# Patient Record
Sex: FEMALE | Race: WHITE | NOT HISPANIC OR LATINO | Employment: OTHER | URBAN - METROPOLITAN AREA
[De-identification: names, ages, dates, MRNs, and addresses within clinical notes are randomized per-mention and may not be internally consistent; named-entity substitution may affect disease eponyms.]

---

## 2020-05-08 ENCOUNTER — TELEMEDICINE (OUTPATIENT)
Dept: FAMILY MEDICINE CLINIC | Facility: CLINIC | Age: 70
End: 2020-05-08

## 2020-05-08 VITALS — HEIGHT: 62 IN | WEIGHT: 115 LBS | BODY MASS INDEX: 21.16 KG/M2

## 2020-05-08 DIAGNOSIS — N30.01 ACUTE CYSTITIS WITH HEMATURIA: ICD-10-CM

## 2020-05-08 DIAGNOSIS — K29.00 ACUTE SUPERFICIAL GASTRITIS WITHOUT HEMORRHAGE: Primary | ICD-10-CM

## 2020-05-08 DIAGNOSIS — N18.30 STAGE 3 CHRONIC KIDNEY DISEASE (HCC): ICD-10-CM

## 2020-05-08 DIAGNOSIS — N18.30 ANEMIA DUE TO STAGE 3 CHRONIC KIDNEY DISEASE (HCC): ICD-10-CM

## 2020-05-08 DIAGNOSIS — D63.1 ANEMIA DUE TO STAGE 3 CHRONIC KIDNEY DISEASE (HCC): ICD-10-CM

## 2020-05-08 PROBLEM — K21.00 GERD WITH ESOPHAGITIS: Status: ACTIVE | Noted: 2019-11-14

## 2020-05-08 PROBLEM — C50.411 MALIGNANT NEOPLASM OF UPPER-OUTER QUADRANT OF RIGHT BREAST IN FEMALE, ESTROGEN RECEPTOR POSITIVE (HCC): Status: ACTIVE | Noted: 2019-09-13

## 2020-05-08 PROBLEM — Z85.3 HISTORY OF RIGHT BREAST CANCER: Status: ACTIVE | Noted: 2020-05-08

## 2020-05-08 PROBLEM — T66.XXXA RADIATION THERAPY COMPLICATION: Status: ACTIVE | Noted: 2020-03-03

## 2020-05-08 PROBLEM — Z79.811 VISIT FOR MONITORING ARIMIDEX THERAPY: Status: ACTIVE | Noted: 2020-03-03

## 2020-05-08 PROBLEM — Z17.0 MALIGNANT NEOPLASM OF UPPER-OUTER QUADRANT OF RIGHT BREAST IN FEMALE, ESTROGEN RECEPTOR POSITIVE: Status: ACTIVE | Noted: 2019-09-13

## 2020-05-08 PROBLEM — K21.00 GERD WITH ESOPHAGITIS: Status: RESOLVED | Noted: 2019-11-14 | Resolved: 2020-05-08

## 2020-05-08 PROBLEM — Z51.81 VISIT FOR MONITORING ARIMIDEX THERAPY: Status: ACTIVE | Noted: 2020-03-03

## 2020-05-08 PROCEDURE — 99214 OFFICE O/P EST MOD 30 MIN: CPT | Performed by: FAMILY MEDICINE

## 2020-05-08 RX ORDER — UBIDECARENONE 75 MG
50 CAPSULE ORAL DAILY
COMMUNITY

## 2020-05-08 RX ORDER — SULFAMETHOXAZOLE AND TRIMETHOPRIM 800; 160 MG/1; MG/1
1 TABLET ORAL 2 TIMES DAILY
Qty: 20 TABLET | Refills: 0 | Status: SHIPPED | OUTPATIENT
Start: 2020-05-08 | End: 2020-09-28

## 2020-05-08 RX ORDER — PANTOPRAZOLE SODIUM 40 MG/1
40 TABLET, DELAYED RELEASE ORAL DAILY
Qty: 30 TABLET | Refills: 3 | Status: SHIPPED | OUTPATIENT
Start: 2020-05-08 | End: 2020-07-13 | Stop reason: SDUPTHER

## 2020-05-08 RX ORDER — ANASTROZOLE 1 MG/1
1 TABLET ORAL DAILY
COMMUNITY
Start: 2019-11-14

## 2020-05-08 RX ORDER — PNV NO.95/FERROUS FUM/FOLIC AC 28MG-0.8MG
TABLET ORAL DAILY
COMMUNITY

## 2020-05-08 RX ORDER — PANTOPRAZOLE SODIUM 40 MG/1
40 TABLET, DELAYED RELEASE ORAL DAILY
COMMUNITY
End: 2020-05-08 | Stop reason: SDUPTHER

## 2020-05-08 NOTE — PROGRESS NOTES
Subjective   Enma Mckinney is a 70 y.o. female.     There were no vitals filed for this visit.     Chief Complaint   Patient presents with   • Establish Care     patient needs to transfer to [facility]   • Flank Pain     patient is needing med for kidney infection         History of Present Illness    This appointment was converted to a video visit in accordance with CDC recommendations and guidelines given the current coronavirus pandemic    Patient presents for recurrent gastritis and likely UTI, recurrent?    Patient's last office visit with me at [facility] was October 2019 for follow-up labs, follow-up on gastritis, and recent diagnosis of breast cancer.    For the most part, patient has been doing very well even given the current pandemic.  She continues to work at [employer].  She has been given the clear from her surgeon/breast oncologist.    She does complain of about a 2-week history of mid abdominal pain with episodes of nausea reoccurring.  She was treated for gastritis in the fall 2019 with Protonix, iron, and discontinuation of all anti-inflammatories.  At that time, her hemoglobin was 9.6.  Yet with treatment this did improve to 12.  She was feeling much better clinically after  2 to 3 months of treatment thus discontinued the Protonix.  She has remained off all anti-inflammatories.  However, recently she is starting to get the mid abdominal pain again.  This is associated with nausea.  No vomiting, no fever.  This seemed to last for few minutes.  Her colonoscopy is up-to-date, 2018.  No EGD has been done.  Her weight has remained stable    She also believes she is getting another urinary tract infection again.  She had a UTI in December that was treated yet this did not clear up and she ended up at the [facility] in January.  At that time she was treated with a sulfa medication which did clear the urinary tract infection.  She complains of about a 2-week history of on and off again lower  abdominal pressure.  She also has seen blood in her urine.  She has been drinking lots of water and using Azo stat and the symptoms have improved somewhat.  Yet still describes some lower abdominal/pelvic pressure with urination and low back pain.  Denies fever    The following portions of the patient's history were reviewed and updated as appropriate: allergies, current medications, past family history, past medical history, past social history, past surgical history and problem list.    Review of Systems   Constitutional: Negative for fever, unexpected weight gain and unexpected weight loss.   Gastrointestinal: Positive for abdominal pain and nausea. Negative for blood in stool, constipation, diarrhea, vomiting and GERD.   Genitourinary: Positive for dysuria, frequency, hematuria and urgency.       Objective   Physical Exam   Constitutional: She is oriented to person, place, and time. She appears well-developed. No distress.   HENT:   Head: Normocephalic and atraumatic.   Pulmonary/Chest: Effort normal.   Abdominal:   Slight tenderness to palpation by patient in the mid epigastric region as well as suprapubic region   Neurological: She is alert and oriented to person, place, and time.   Psychiatric: She has a normal mood and affect. Her behavior is normal. Judgment and thought content normal.   Nursing note and vitals reviewed.       LABS/STUDIES --12/2019 GFR 52, October 2019 hemoglobin 12    Procedures     Assessment/Plan   Enma was seen today for establish care and flank pain.    Diagnoses and all orders for this visit:    Acute gastritis --uncontrolled/recurrent?  At this time will restart Protonix 40 mg 1 p.o. daily.  Must reevaluate patient in the office in within the next 2 to 4 weeks and obtain a repeat CBC.  May very likely need endoscopy?  H. Pylori.  Patient is to remain off all anti-inflammatories    Acute cystitis with hematuria --new diagnosis/recurrent UTIs?  Will treat with Bactrim 1 p.o. twice  daily x10 days.  Will need to recheck urinalysis within the next 4 weeks.  Will start to monitor and culture all future potential urinary tract infections for reoccurrence.    Stage 3 chronic kidney disease (CMS/HCC) --stable.  Remain off all anti-inflammatories    Anemia due to stage 3 chronic kidney disease (CMS/HCC) --stable.  Secondary to chronic kidney disease and/or gastritis?  May restart iron 3 and 25 mg 1 p.o. daily.  Will recheck CBC in near future    Other orders  -     pantoprazole (PROTONIX) 40 MG EC tablet; Take 1 tablet by mouth Daily.  -     sulfamethoxazole-trimethoprim (Bactrim DS) 800-160 MG per tablet; Take 1 tablet by mouth 2 (Two) Times a Day.      [facility] video visit time 25 minutes           Return in about 4 weeks (around 6/5/2020), or Please change appointment from May 20 to a Tuesday or Friday with me within the next 2 to 4 weeks on, for Recheck.     This was an audio and video enabled telemedicine encounter.

## 2020-05-08 NOTE — PATIENT INSTRUCTIONS
Restart Protonix 1 a day  Continue to avoid all anti-inflammatories  Take the Bactrim 1 twice a day for 10 days

## 2020-05-29 ENCOUNTER — OFFICE VISIT (OUTPATIENT)
Dept: FAMILY MEDICINE CLINIC | Facility: CLINIC | Age: 70
End: 2020-05-29

## 2020-05-29 VITALS
BODY MASS INDEX: 21.16 KG/M2 | OXYGEN SATURATION: 97 % | WEIGHT: 115 LBS | SYSTOLIC BLOOD PRESSURE: 118 MMHG | TEMPERATURE: 99.1 F | DIASTOLIC BLOOD PRESSURE: 64 MMHG | HEART RATE: 73 BPM | HEIGHT: 62 IN

## 2020-05-29 DIAGNOSIS — N30.01 ACUTE CYSTITIS WITH HEMATURIA: ICD-10-CM

## 2020-05-29 DIAGNOSIS — N18.30 ANEMIA DUE TO STAGE 3 CHRONIC KIDNEY DISEASE (HCC): ICD-10-CM

## 2020-05-29 DIAGNOSIS — N02.9 RECURRENT AND PERSISTENT HEMATURIA: ICD-10-CM

## 2020-05-29 DIAGNOSIS — R10.13 EPIGASTRIC PAIN: Primary | ICD-10-CM

## 2020-05-29 DIAGNOSIS — K29.00 ACUTE SUPERFICIAL GASTRITIS WITHOUT HEMORRHAGE: ICD-10-CM

## 2020-05-29 DIAGNOSIS — N39.0 RECURRENT UTI: ICD-10-CM

## 2020-05-29 DIAGNOSIS — R30.0 DYSURIA: ICD-10-CM

## 2020-05-29 DIAGNOSIS — D63.1 ANEMIA DUE TO STAGE 3 CHRONIC KIDNEY DISEASE (HCC): ICD-10-CM

## 2020-05-29 DIAGNOSIS — N39.0 RECURRENT UTI: Primary | ICD-10-CM

## 2020-05-29 LAB
BILIRUB BLD-MCNC: NEGATIVE MG/DL
CLARITY, POC: CLEAR
COLOR UR: YELLOW
GLUCOSE UR STRIP-MCNC: NEGATIVE MG/DL
KETONES UR QL: NEGATIVE
LEUKOCYTE EST, POC: ABNORMAL
NITRITE UR-MCNC: NEGATIVE MG/ML
PH UR: 5.5 [PH] (ref 5–8)
PROT UR STRIP-MCNC: ABNORMAL MG/DL
RBC # UR STRIP: ABNORMAL /UL
SP GR UR: 1.03 (ref 1–1.03)
UROBILINOGEN UR QL: NORMAL

## 2020-05-29 PROCEDURE — 81003 URINALYSIS AUTO W/O SCOPE: CPT | Performed by: FAMILY MEDICINE

## 2020-05-29 PROCEDURE — 99214 OFFICE O/P EST MOD 30 MIN: CPT | Performed by: FAMILY MEDICINE

## 2020-05-29 NOTE — PROGRESS NOTES
"Subjective   Enma Mckinney is a 70 y.o. female.     Vitals:    05/29/20 1150   BP: 118/64   Pulse: 73   Temp: 99.1 °F (37.3 °C)   SpO2: 97%        Chief Complaint   Patient presents with   • Anemia     follow up no complains    • Chronic Kidney Disease     follow up no complains    • Abdominal Pain        History of Present Illness    4-week follow-up for abdominal pain/gastritis, anemia, recurrent UTI and hematuria    Patient was evaluated by means of a telehealth visit approximately 4 weeks ago for recurrent episode of gastritis and recurrent UTI with hematuria.    Patient has had this history of on and off again \"burning upper abdominal pain\" for several months.  She was treated in the past with as needed PPIs which helped tremendously thus no work-up was pursued.  Then in August 2019 I diagnosed her with anemia and some concern with weight loss.  However, at that time she was also diagnosed with a new breast cancer.  Thus, she underwent a lumpectomy with radiation treatment during the fall 2019.  Patient states she was doing much better overall until approximately last month when her abdominal pain/burning feeling after eating which started to recur.  She describes this as episodic burning discomfort after eating.  She states her weight has been stable over the last 6 months.  She also describes some type of dysphasia to certain foods.  No nausea or vomiting, or fever.  She has never had an upper endoscopy.  Thus, 4 weeks ago do telehealth she was prescribed Protonix.  She was told to avoid all anti-inflammatories.  This treatment has helped; however, she still does have this episodic intermittent burning in her upper abdomen after eating.    In addition, she is also had recurrent urinary tract infections with hematuria over the last several months.  She was evaluated on both occasions at an [facility] care center and diagnosed with UTIs and treated with antibiotics.  The first 1 was in December 2019 and the " second episode was March 2020.  Then again about 4 weeks ago she complained of similar feelings with dysuria, increased frequency, and blood in her urine.  Thus to a telehealth visit she was started on antibiotics and told to follow-up today.  She states the antibiotic I gave her/Bactrim did improve her symptoms.  She is without symptoms today.  Denies blood, dysuria, increased frequency today.    She is also had chronic renal insufficiency that has remained stable.  Note, she did have a CBC and a CMP performed in March 2020 at 1 of these immediate care visits for the recurrent urinary tract infection.  Her hemoglobin was normal, GFR 44    The following portions of the patient's history were reviewed and updated as appropriate: allergies, current medications, past family history, past medical history, past social history, past surgical history and problem list.    Review of Systems   Constitutional: Negative.  Negative for fatigue.   Eyes: Negative.    Respiratory: Negative.    Cardiovascular: Negative for chest pain, palpitations and leg swelling.   Gastrointestinal: Negative.    Endocrine: Negative.    Genitourinary: Negative.    Musculoskeletal: Positive for arthralgias.   Allergic/Immunologic: Negative.    Neurological: Negative.    Hematological: Negative.    I have reviewed and confirmed the accuracy of the ROS as documented by the MA/LPN/RN Evelia Baldwin MD      Objective   Physical Exam   Constitutional: She appears well-developed.   HENT:   Head: Normocephalic and atraumatic.   Eyes: Pupils are equal, round, and reactive to light. Conjunctivae are normal.   Neck: Normal range of motion. Neck supple. No thyromegaly present.   Cardiovascular: Normal rate, regular rhythm and normal heart sounds.   Pulmonary/Chest: Effort normal and breath sounds normal.   Abdominal: Soft. Bowel sounds are normal. She exhibits no distension. There is no hepatosplenomegaly. There is no tenderness.   Musculoskeletal: She  exhibits no edema.   Lymphadenopathy:     She has no cervical adenopathy.   Psychiatric: She has a normal mood and affect. Her behavior is normal. Judgment and thought content normal.   Nursing note and vitals reviewed.       LABS/STUDIES --March 2020 normal CBC, GFR 44, normal electrolytes, normal creatinine                                 Today's urine dip significant for positive blood, 300+ protein, trace leukocytes    Procedures     Assessment/Plan   Enma was seen today for anemia, chronic kidney disease and abdominal pain.    Diagnoses and all orders for this visit:    Epigastric pain --uncontrolled/recurrent.  Will refer to gastroenterology, patient needs EGD.  Need to avoid all orders.  Also, continue to take Protonix 40 mg 1 p.o. daily.  -     Ambulatory Referral to Gastroenterology    Acute superficial gastritis without hemorrhage  -     Ambulatory Referral to Gastroenterology    Anemia due to stage 3 chronic kidney disease (CMS/HCC) --stable.  Will recheck CBC and BMP in approximately 6 weeks.. May need referral to nephrology as well and work-up with renal ultrasound, etc.    Recurrent and persistent hematuria --uncontrolled.  Will send today's urine for culture and sensitivity.  If positive will treat accordingly.  Given the recurrent UTIs of such short duration, renal insufficiency, and persistent hematuria will refer to urology for further evaluation  -     Ambulatory Referral to Urology    Recurrent UTI --uncontrolled.  Will recheck urine culture and sensitivity today and treat accordingly  -     Ambulatory Referral to Urology    Acute cystitis with hematuria  -     Urine Culture - Urine, Urine, Clean Catch    Dysuria  -     POC Urinalysis Dipstick, Automated      Recheck CBC and BMP in 6 weeks           Return in about 6 weeks (around 7/10/2020) for Recheck.

## 2020-05-29 NOTE — PATIENT INSTRUCTIONS
Continue to avoid all anti-inflammatories, continue Protonix as prescribed  Referral to gastroenterology and urology  Follow-up with me in 6 weeks

## 2020-05-31 LAB
BACTERIA UR CULT: NORMAL
BACTERIA UR CULT: NORMAL

## 2020-06-30 ENCOUNTER — OFFICE VISIT (OUTPATIENT)
Dept: GASTROENTEROLOGY | Facility: CLINIC | Age: 70
End: 2020-06-30

## 2020-06-30 VITALS — TEMPERATURE: 97.6 F | WEIGHT: 115 LBS | HEIGHT: 63 IN | BODY MASS INDEX: 20.38 KG/M2

## 2020-06-30 DIAGNOSIS — R10.13 EPIGASTRIC PAIN: Primary | ICD-10-CM

## 2020-06-30 PROCEDURE — 99203 OFFICE O/P NEW LOW 30 MIN: CPT | Performed by: INTERNAL MEDICINE

## 2020-06-30 NOTE — PROGRESS NOTES
Chief Complaint   Patient presents with   • Abdominal Pain   • Nausea     Enma Mckinney is a 70 y.o. female who presents with a history of years of episodic abdominal pain  HPI     Patient 70-year-old female with history of breast cancer status post lumpectomy in January and recurrent UTIs with weight loss several years ago when her  passed away but otherwise stable presents now for evaluation of years of epigastric pain intermittently.  Pain occurs acutely and resolves within a minute or 2.  Pain unrelated to eating or drinking.  Patient reports pain unrelated to bowel movements or no bowel movements.  Patient denies any constipation or diarrhea no issues with her bowels at all.  Patient with no fever chills no recent weight loss and the symptoms have been going on for years.  Patient's weight loss occurred after patient's  passed but is been stable for the last year or so.  Patient denies any bright red blood per rectum or melena.    Past Medical History:   Diagnosis Date   • Anemia    • Breast cancer (CMS/HCC)    • Contact dermatitis    • Gastritis    • UTI (urinary tract infection)    • Weight loss        Current Outpatient Medications:   •  anastrozole (ARIMIDEX) 1 MG tablet, Take 1 mg by mouth Daily., Disp: , Rfl:   •  APPLE CIDER VINEGAR PO, Take  by mouth Daily., Disp: , Rfl:   •  Calcium Carbonate-Vitamin D 600-200 MG-UNIT tablet, Take  by mouth., Disp: , Rfl:   •  ferrous sulfate 325 (65 Fe) MG tablet, Take  by mouth Daily., Disp: , Rfl:   •  pantoprazole (PROTONIX) 40 MG EC tablet, Take 1 tablet by mouth Daily., Disp: 30 tablet, Rfl: 3  •  vitamin B-12 (CYANOCOBALAMIN) 100 MCG tablet, Take 50 mcg by mouth Daily., Disp: , Rfl:   •  sulfamethoxazole-trimethoprim (Bactrim DS) 800-160 MG per tablet, Take 1 tablet by mouth 2 (Two) Times a Day., Disp: 20 tablet, Rfl: 0  No Known Allergies  Social History     Socioeconomic History   • Marital status:      Spouse name: Not on file   •  Number of children: Not on file   • Years of education: Not on file   • Highest education level: Not on file   Tobacco Use   • Smoking status: Never Smoker   • Smokeless tobacco: Never Used   Substance and Sexual Activity   • Alcohol use: Yes     Comment: occ glass of wine    • Drug use: Never     History reviewed. No pertinent family history.  Review of Systems   Constitutional: Negative.    HENT: Negative.    Eyes: Negative.    Respiratory: Negative.    Cardiovascular: Negative.    Gastrointestinal: Positive for abdominal pain. Negative for abdominal distention, anal bleeding, blood in stool, constipation, diarrhea, nausea, rectal pain and vomiting.   Endocrine: Negative.    Musculoskeletal: Negative.    Skin: Negative.    Allergic/Immunologic: Negative.    Hematological: Negative.      Vitals:    06/30/20 0915   Temp: 97.6 °F (36.4 °C)     Physical Exam   Constitutional: She is oriented to person, place, and time. She appears well-developed and well-nourished.   HENT:   Head: Normocephalic and atraumatic.   Eyes: Pupils are equal, round, and reactive to light. No scleral icterus.   Neck: Normal range of motion.   Cardiovascular: Normal rate, regular rhythm and normal heart sounds. Exam reveals no gallop and no friction rub.   No murmur heard.  Pulmonary/Chest: Effort normal and breath sounds normal. She has no wheezes. She has no rales.   Abdominal: Soft. Bowel sounds are normal. She exhibits no shifting dullness, no distension, no pulsatile liver, no fluid wave, no abdominal bruit, no ascites, no pulsatile midline mass and no mass. There is no hepatosplenomegaly. There is no tenderness. There is no rigidity and no guarding. No hernia.   Musculoskeletal: Normal range of motion. She exhibits no edema.   Lymphadenopathy:     She has no cervical adenopathy.   Neurological: She is alert and oriented to person, place, and time. No cranial nerve deficit.   Skin: Skin is warm and dry. No rash noted.   Psychiatric: She  has a normal mood and affect. Her behavior is normal.   Nursing note and vitals reviewed.    Diagnoses and all orders for this visit:    Epigastric pain    Other orders  -     APPLE CIDER VINEGAR PO; Take  by mouth Daily.    Patient 70-year-old female with history of breast cancer status post lumpectomy who presents with years of intermittent abdominal pain in the epigastric area unrelated to eating or drinking unrelated to bowels or no bowels.  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  Patient reports pain comes as a spasm-like discomfort lasts 1 to 2 minutes then resolves completely.  Patient reports last episode happened while she was driving about 11 in the morning.  Patient reports no association with what she eats or when it happens.  Symptoms inconsistent with internal GI disorder.  Patient does describe a complicated gallbladder resection 30 years ago with severe inflammation requiring 10 days in the hospital.  Symptoms may be related to internal hernias and adhesions.  For now patient is already scheduled for CT of the abdomen July 7, will await findings on CT before further intervention recommended.  Will recommend patient continue PPI pending this evaluation and assess if there is any change in the degree of her symptoms.

## 2020-07-13 ENCOUNTER — TELEPHONE (OUTPATIENT)
Dept: GASTROENTEROLOGY | Facility: CLINIC | Age: 70
End: 2020-07-13

## 2020-07-13 ENCOUNTER — OFFICE VISIT (OUTPATIENT)
Dept: FAMILY MEDICINE CLINIC | Facility: CLINIC | Age: 70
End: 2020-07-13

## 2020-07-13 VITALS
HEIGHT: 63 IN | TEMPERATURE: 98.2 F | BODY MASS INDEX: 21.26 KG/M2 | DIASTOLIC BLOOD PRESSURE: 78 MMHG | SYSTOLIC BLOOD PRESSURE: 130 MMHG | WEIGHT: 120 LBS

## 2020-07-13 DIAGNOSIS — N02.9 RECURRENT AND PERSISTENT HEMATURIA: ICD-10-CM

## 2020-07-13 DIAGNOSIS — D63.1 ANEMIA DUE TO STAGE 3 CHRONIC KIDNEY DISEASE (HCC): ICD-10-CM

## 2020-07-13 DIAGNOSIS — N39.0 RECURRENT UTI: ICD-10-CM

## 2020-07-13 DIAGNOSIS — K29.00 ACUTE SUPERFICIAL GASTRITIS WITHOUT HEMORRHAGE: Primary | ICD-10-CM

## 2020-07-13 DIAGNOSIS — N18.30 ANEMIA DUE TO STAGE 3 CHRONIC KIDNEY DISEASE (HCC): ICD-10-CM

## 2020-07-13 PROBLEM — R30.0 DYSURIA: Status: RESOLVED | Noted: 2020-05-29 | Resolved: 2020-07-13

## 2020-07-13 PROBLEM — T66.XXXA RADIATION THERAPY COMPLICATION: Status: RESOLVED | Noted: 2020-03-03 | Resolved: 2020-07-13

## 2020-07-13 PROBLEM — N30.01 ACUTE CYSTITIS WITH HEMATURIA: Status: RESOLVED | Noted: 2020-05-08 | Resolved: 2020-07-13

## 2020-07-13 PROCEDURE — 99214 OFFICE O/P EST MOD 30 MIN: CPT | Performed by: FAMILY MEDICINE

## 2020-07-13 RX ORDER — PANTOPRAZOLE SODIUM 40 MG/1
40 TABLET, DELAYED RELEASE ORAL DAILY
Qty: 90 TABLET | Refills: 1 | Status: SHIPPED | OUTPATIENT
Start: 2020-07-13 | End: 2021-02-05

## 2020-07-13 NOTE — PATIENT INSTRUCTIONS
Continue current treatment plan.  Continue to avoid all anti-inflammatories  Follow-up with specialist as planned

## 2020-07-13 NOTE — PROGRESS NOTES
Subjective   Enma Mckinney is a 70 y.o. female.     Vitals:    07/13/20 1238   BP: 130/78   Temp: 98.2 °F (36.8 °C)        Chief Complaint   Patient presents with   • Abdominal Pain     6 weeks follow up pt dping better         History of Present Illness    6-week follow-up on abdominal pain and hematuria and renal dysfunction    Last office visit with me patient was still having persistent epigastric pain despite being off anti-inflammatories and treatment with a PPI for 4 to 6 weeks.  Thus, she was referred to GI/Dr. Dale for further evaluation of possible endoscopy.  Per GI note the decision was made to hold on any endoscopy at the time given she start to improve clinically as well as had planned outpatient CT scans of abdomen and pelvis due to the persistent hematuria.  She was also referred to urology/Dr. Castellanos for work-up of persistent and recurrent hematuria.  She did have outpatient CT abdomen/ pelvis last week at first urology office, results pending.  She does have a follow-up with the specialist/urologist later this week.    On a good note, her upper abdominal pain is much improved.  She is gaining weight.  Appetite good.  She does have follow-up with GI, however they are waiting results of CT scan before determining whether to do endoscopy.  She continues to remain off all anti-inflammatories for over 6 months now.  She has been on Protonix x2 months.  Tolerates medication without side effects.  Does request a refill today.    Follow-up on kidney dysfunction.  She was diagnosed with renal insufficiency March 2020, GFR 44.  Again, this is when she is beginning to have hematuria and recurrent UTIs.  She continues to refrain from all anti-inflammatories.  No diabetes, no hypertension diagnosis    The following portions of the patient's history were reviewed and updated as appropriate: allergies, current medications, past family history, past medical history, past social history, past surgical history  and problem list.    Review of Systems   Constitutional: Negative for fever, unexpected weight gain and unexpected weight loss.   Gastrointestinal: Negative for abdominal pain.   Genitourinary: Negative for dysuria and hematuria.       Objective   Physical Exam   Constitutional: She appears well-developed.   HENT:   Head: Normocephalic and atraumatic.   Eyes: Pupils are equal, round, and reactive to light. Conjunctivae are normal.   Neck: Normal range of motion. Neck supple. No thyromegaly present.   Cardiovascular: Normal rate, regular rhythm and normal heart sounds.   Pulmonary/Chest: Effort normal and breath sounds normal.   Abdominal: Soft. Bowel sounds are normal. She exhibits no distension. There is no hepatosplenomegaly. There is no tenderness.   Musculoskeletal: She exhibits no edema.   Lymphadenopathy:     She has no cervical adenopathy.   Skin: Skin is warm and dry. No rash noted.   Psychiatric: She has a normal mood and affect. Her behavior is normal. Judgment and thought content normal.   Nursing note and vitals reviewed.       LABS/STUDIES March 2020 normal CBC, GFR 44, otherwise normal BMP    Procedures     Assessment/Plan   Enma was seen today for abdominal pain.    Diagnoses and all orders for this visit:    Acute superficial gastritis without hemorrhage clinically much improved.  Will continue Protonix 40 mg 1 p.o. daily, refill done.  Follow-up with GI doc as planned    Anemia due to stage 3 chronic kidney disease (CMS/HCC) new diagnosis.  Will recheck BMP today.  Currently under evaluation for persistent hematuria, CT abdomen pelvis pending, outpatient cystoscopy planned.  Suspect may be secondary to recurrent UTI history?  Will await work-up for hematuria at this time with planned studies.  -     Basic Metabolic Panel    Recurrent UTI work-up in progress per urologist    Recurrent and persistent hematuria work-up in progress per urologist.  CT abdomen/pelvis pending, outpatient cystoscopy  planned    Other orders  -     pantoprazole (PROTONIX) 40 MG EC tablet; Take 1 tablet by mouth Daily.                 Return in about 2 months (around 9/13/2020) for Medicare Wellness.

## 2020-07-14 LAB
BUN SERPL-MCNC: 26 MG/DL (ref 8–27)
BUN/CREAT SERPL: 17 (ref 12–28)
CALCIUM SERPL-MCNC: 9 MG/DL (ref 8.7–10.3)
CHLORIDE SERPL-SCNC: 105 MMOL/L (ref 96–106)
CO2 SERPL-SCNC: 24 MMOL/L (ref 20–29)
CREAT SERPL-MCNC: 1.54 MG/DL (ref 0.57–1)
GLUCOSE SERPL-MCNC: 75 MG/DL (ref 65–99)
POTASSIUM SERPL-SCNC: 4.7 MMOL/L (ref 3.5–5.2)
SODIUM SERPL-SCNC: 142 MMOL/L (ref 134–144)

## 2020-08-06 ENCOUNTER — TRANSCRIBE ORDERS (OUTPATIENT)
Dept: ADMINISTRATIVE | Facility: HOSPITAL | Age: 70
End: 2020-08-06

## 2020-08-06 ENCOUNTER — LAB (OUTPATIENT)
Dept: LAB | Facility: HOSPITAL | Age: 70
End: 2020-08-06

## 2020-08-06 ENCOUNTER — HOSPITAL ENCOUNTER (OUTPATIENT)
Dept: CARDIOLOGY | Facility: HOSPITAL | Age: 70
Discharge: HOME | End: 2020-08-06
Admitting: UROLOGY

## 2020-08-06 DIAGNOSIS — N28.9 URETERAL SLUDGE: ICD-10-CM

## 2020-08-06 DIAGNOSIS — Z01.818 PREOPERATIVE CLEARANCE: ICD-10-CM

## 2020-08-06 DIAGNOSIS — N28.9 URETERAL SLUDGE: Primary | ICD-10-CM

## 2020-08-06 LAB
ABO GROUP BLD: NORMAL
ANION GAP SERPL CALCULATED.3IONS-SCNC: 7.1 MMOL/L (ref 5–15)
BASOPHILS # BLD AUTO: 0.02 10*3/MM3 (ref 0–0.2)
BASOPHILS NFR BLD AUTO: 0.3 % (ref 0–1.5)
BLD GP AB SCN SERPL QL: NEGATIVE
BUN SERPL-MCNC: 18 MG/DL (ref 8–23)
BUN/CREAT SERPL: 13.7 (ref 7–25)
CALCIUM SPEC-SCNC: 9.4 MG/DL (ref 8.6–10.5)
CHLORIDE SERPL-SCNC: 105 MMOL/L (ref 98–107)
CO2 SERPL-SCNC: 26.9 MMOL/L (ref 22–29)
CREAT SERPL-MCNC: 1.31 MG/DL (ref 0.57–1)
DEPRECATED RDW RBC AUTO: 40.9 FL (ref 37–54)
EOSINOPHIL # BLD AUTO: 0.13 10*3/MM3 (ref 0–0.4)
EOSINOPHIL NFR BLD AUTO: 1.6 % (ref 0.3–6.2)
ERYTHROCYTE [DISTWIDTH] IN BLOOD BY AUTOMATED COUNT: 12.3 % (ref 12.3–15.4)
GFR SERPL CREATININE-BSD FRML MDRD: 40 ML/MIN/1.73
GLUCOSE SERPL-MCNC: 83 MG/DL (ref 65–99)
HCT VFR BLD AUTO: 33.6 % (ref 34–46.6)
HGB BLD-MCNC: 11.3 G/DL (ref 12–15.9)
IMM GRANULOCYTES # BLD AUTO: 0.03 10*3/MM3 (ref 0–0.05)
IMM GRANULOCYTES NFR BLD AUTO: 0.4 % (ref 0–0.5)
LYMPHOCYTES # BLD AUTO: 0.71 10*3/MM3 (ref 0.7–3.1)
LYMPHOCYTES NFR BLD AUTO: 9 % (ref 19.6–45.3)
MCH RBC QN AUTO: 30.5 PG (ref 26.6–33)
MCHC RBC AUTO-ENTMCNC: 33.6 G/DL (ref 31.5–35.7)
MCV RBC AUTO: 90.8 FL (ref 79–97)
MONOCYTES # BLD AUTO: 0.55 10*3/MM3 (ref 0.1–0.9)
MONOCYTES NFR BLD AUTO: 6.9 % (ref 5–12)
NEUTROPHILS NFR BLD AUTO: 6.49 10*3/MM3 (ref 1.7–7)
NEUTROPHILS NFR BLD AUTO: 81.8 % (ref 42.7–76)
NRBC BLD AUTO-RTO: 0 /100 WBC (ref 0–0.2)
PLATELET # BLD AUTO: 222 10*3/MM3 (ref 140–450)
PMV BLD AUTO: 10.4 FL (ref 6–12)
POTASSIUM SERPL-SCNC: 4.1 MMOL/L (ref 3.5–5.2)
RBC # BLD AUTO: 3.7 10*6/MM3 (ref 3.77–5.28)
RH BLD: POSITIVE
SODIUM SERPL-SCNC: 139 MMOL/L (ref 136–145)
T&S EXPIRATION DATE: NORMAL
WBC # BLD AUTO: 7.93 10*3/MM3 (ref 3.4–10.8)

## 2020-08-06 PROCEDURE — 86900 BLOOD TYPING SEROLOGIC ABO: CPT | Performed by: UROLOGY

## 2020-08-06 PROCEDURE — 86900 BLOOD TYPING SEROLOGIC ABO: CPT

## 2020-08-06 PROCEDURE — U0004 COV-19 TEST NON-CDC HGH THRU: HCPCS

## 2020-08-06 PROCEDURE — 86901 BLOOD TYPING SEROLOGIC RH(D): CPT

## 2020-08-06 PROCEDURE — 85025 COMPLETE CBC W/AUTO DIFF WBC: CPT

## 2020-08-06 PROCEDURE — 93005 ELECTROCARDIOGRAM TRACING: CPT | Performed by: UROLOGY

## 2020-08-06 PROCEDURE — C9803 HOPD COVID-19 SPEC COLLECT: HCPCS

## 2020-08-06 PROCEDURE — 86901 BLOOD TYPING SEROLOGIC RH(D): CPT | Performed by: UROLOGY

## 2020-08-06 PROCEDURE — 80048 BASIC METABOLIC PNL TOTAL CA: CPT

## 2020-08-06 PROCEDURE — 36415 COLL VENOUS BLD VENIPUNCTURE: CPT

## 2020-08-06 PROCEDURE — 86850 RBC ANTIBODY SCREEN: CPT | Performed by: UROLOGY

## 2020-08-06 PROCEDURE — U0002 COVID-19 LAB TEST NON-CDC: HCPCS

## 2020-08-07 LAB
REF LAB TEST METHOD: NORMAL
SARS-COV-2 RNA RESP QL NAA+PROBE: NOT DETECTED

## 2020-08-08 PROCEDURE — 93010 ELECTROCARDIOGRAM REPORT: CPT | Performed by: INTERNAL MEDICINE

## 2020-08-12 PROCEDURE — 88307 TISSUE EXAM BY PATHOLOGIST: CPT | Performed by: UROLOGY

## 2020-08-13 ENCOUNTER — LAB REQUISITION (OUTPATIENT)
Dept: LAB | Facility: HOSPITAL | Age: 70
End: 2020-08-13

## 2020-08-13 DIAGNOSIS — N28.9 DISORDER OF KIDNEY AND URETER, UNSPECIFIED: ICD-10-CM

## 2020-08-13 DIAGNOSIS — C50.919 MALIGNANT NEOPLASM OF UNSPECIFIED SITE OF UNSPECIFIED FEMALE BREAST (HCC): ICD-10-CM

## 2020-08-13 LAB
ANION GAP SERPL CALCULATED.3IONS-SCNC: 6 MMOL/L (ref 5–15)
BASOPHILS # BLD AUTO: 0 10*3/MM3 (ref 0–0.2)
BASOPHILS NFR BLD AUTO: 0.3 % (ref 0–1.5)
BUN SERPL-MCNC: 17 MG/DL (ref 8–23)
BUN SERPL-MCNC: ABNORMAL MG/DL
BUN/CREAT SERPL: ABNORMAL
CALCIUM SPEC-SCNC: 8.5 MG/DL (ref 8.6–10.5)
CHLORIDE SERPL-SCNC: 103 MMOL/L (ref 98–107)
CO2 SERPL-SCNC: 27 MMOL/L (ref 22–29)
CREAT SERPL-MCNC: 1.38 MG/DL (ref 0.57–1)
DEPRECATED RDW RBC AUTO: 42 FL (ref 37–54)
EOSINOPHIL # BLD AUTO: 0 10*3/MM3 (ref 0–0.4)
EOSINOPHIL NFR BLD AUTO: 0.2 % (ref 0.3–6.2)
ERYTHROCYTE [DISTWIDTH] IN BLOOD BY AUTOMATED COUNT: 12.9 % (ref 12.3–15.4)
GFR SERPL CREATININE-BSD FRML MDRD: 38 ML/MIN/1.73
GLUCOSE SERPL-MCNC: 109 MG/DL (ref 65–99)
HCT VFR BLD AUTO: 31.8 % (ref 34–46.6)
HGB BLD-MCNC: 10.4 G/DL (ref 12–15.9)
LYMPHOCYTES # BLD AUTO: 0.7 10*3/MM3 (ref 0.7–3.1)
LYMPHOCYTES NFR BLD AUTO: 10.2 % (ref 19.6–45.3)
MCH RBC QN AUTO: 29.8 PG (ref 26.6–33)
MCHC RBC AUTO-ENTMCNC: 32.6 G/DL (ref 31.5–35.7)
MCV RBC AUTO: 91.3 FL (ref 79–97)
MONOCYTES # BLD AUTO: 0.7 10*3/MM3 (ref 0.1–0.9)
MONOCYTES NFR BLD AUTO: 9.7 % (ref 5–12)
NEUTROPHILS NFR BLD AUTO: 5.6 10*3/MM3 (ref 1.7–7)
NEUTROPHILS NFR BLD AUTO: 79.6 % (ref 42.7–76)
NRBC BLD AUTO-RTO: 0.1 /100 WBC (ref 0–0.2)
PLATELET # BLD AUTO: 167 10*3/MM3 (ref 140–450)
PMV BLD AUTO: 8.2 FL (ref 6–12)
POTASSIUM SERPL-SCNC: 4.7 MMOL/L (ref 3.5–5.2)
RBC # BLD AUTO: 3.49 10*6/MM3 (ref 3.77–5.28)
SODIUM SERPL-SCNC: 136 MMOL/L (ref 136–145)
WBC # BLD AUTO: 7.1 10*3/MM3 (ref 3.4–10.8)

## 2020-08-13 PROCEDURE — 80048 BASIC METABOLIC PNL TOTAL CA: CPT | Performed by: UROLOGY

## 2020-08-13 PROCEDURE — 85025 COMPLETE CBC W/AUTO DIFF WBC: CPT | Performed by: UROLOGY

## 2020-08-14 ENCOUNTER — LAB REQUISITION (OUTPATIENT)
Dept: LAB | Facility: HOSPITAL | Age: 70
End: 2020-08-14

## 2020-08-14 DIAGNOSIS — D64.9 ANEMIA, UNSPECIFIED: ICD-10-CM

## 2020-08-14 DIAGNOSIS — C50.919 MALIGNANT NEOPLASM OF UNSPECIFIED SITE OF UNSPECIFIED FEMALE BREAST (HCC): ICD-10-CM

## 2020-08-14 DIAGNOSIS — K21.9 GASTRO-ESOPHAGEAL REFLUX DISEASE WITHOUT ESOPHAGITIS: ICD-10-CM

## 2020-08-14 DIAGNOSIS — N28.9 DISORDER OF KIDNEY AND URETER, UNSPECIFIED: ICD-10-CM

## 2020-08-14 LAB
ANION GAP SERPL CALCULATED.3IONS-SCNC: 7 MMOL/L (ref 5–15)
BASOPHILS # BLD AUTO: 0 10*3/MM3 (ref 0–0.2)
BASOPHILS NFR BLD AUTO: 0.3 % (ref 0–1.5)
BUN SERPL-MCNC: 13 MG/DL (ref 8–23)
BUN SERPL-MCNC: ABNORMAL MG/DL
BUN/CREAT SERPL: ABNORMAL
CALCIUM SPEC-SCNC: 8.4 MG/DL (ref 8.6–10.5)
CHLORIDE SERPL-SCNC: 100 MMOL/L (ref 98–107)
CO2 SERPL-SCNC: 30 MMOL/L (ref 22–29)
CREAT SERPL-MCNC: 1.3 MG/DL (ref 0.57–1)
DEPRECATED RDW RBC AUTO: 41.6 FL (ref 37–54)
EOSINOPHIL # BLD AUTO: 0.1 10*3/MM3 (ref 0–0.4)
EOSINOPHIL NFR BLD AUTO: 1 % (ref 0.3–6.2)
ERYTHROCYTE [DISTWIDTH] IN BLOOD BY AUTOMATED COUNT: 12.9 % (ref 12.3–15.4)
GFR SERPL CREATININE-BSD FRML MDRD: 40 ML/MIN/1.73
GLUCOSE SERPL-MCNC: 111 MG/DL (ref 65–99)
HCT VFR BLD AUTO: 29.5 % (ref 34–46.6)
HGB BLD-MCNC: 9.7 G/DL (ref 12–15.9)
LYMPHOCYTES # BLD AUTO: 0.5 10*3/MM3 (ref 0.7–3.1)
LYMPHOCYTES NFR BLD AUTO: 6.8 % (ref 19.6–45.3)
MCH RBC QN AUTO: 29.9 PG (ref 26.6–33)
MCHC RBC AUTO-ENTMCNC: 32.7 G/DL (ref 31.5–35.7)
MCV RBC AUTO: 91.5 FL (ref 79–97)
MONOCYTES # BLD AUTO: 0.7 10*3/MM3 (ref 0.1–0.9)
MONOCYTES NFR BLD AUTO: 9.7 % (ref 5–12)
NEUTROPHILS NFR BLD AUTO: 6.3 10*3/MM3 (ref 1.7–7)
NEUTROPHILS NFR BLD AUTO: 82.2 % (ref 42.7–76)
NRBC BLD AUTO-RTO: 0 /100 WBC (ref 0–0.2)
PLATELET # BLD AUTO: 151 10*3/MM3 (ref 140–450)
PMV BLD AUTO: 8.6 FL (ref 6–12)
POTASSIUM SERPL-SCNC: 4.2 MMOL/L (ref 3.5–5.2)
RBC # BLD AUTO: 3.23 10*6/MM3 (ref 3.77–5.28)
SODIUM SERPL-SCNC: 137 MMOL/L (ref 136–145)
WBC # BLD AUTO: 7.6 10*3/MM3 (ref 3.4–10.8)

## 2020-08-14 PROCEDURE — 85025 COMPLETE CBC W/AUTO DIFF WBC: CPT | Performed by: UROLOGY

## 2020-08-14 PROCEDURE — 80048 BASIC METABOLIC PNL TOTAL CA: CPT | Performed by: UROLOGY

## 2020-09-28 ENCOUNTER — OFFICE VISIT (OUTPATIENT)
Dept: FAMILY MEDICINE CLINIC | Facility: CLINIC | Age: 70
End: 2020-09-28

## 2020-09-28 VITALS
SYSTOLIC BLOOD PRESSURE: 102 MMHG | HEART RATE: 85 BPM | OXYGEN SATURATION: 98 % | BODY MASS INDEX: 20.55 KG/M2 | DIASTOLIC BLOOD PRESSURE: 66 MMHG | TEMPERATURE: 98.2 F | WEIGHT: 116 LBS | HEIGHT: 63 IN

## 2020-09-28 DIAGNOSIS — C64.2 CARCINOMA OF LEFT KIDNEY (HCC): ICD-10-CM

## 2020-09-28 DIAGNOSIS — Z09 HOSPITAL DISCHARGE FOLLOW-UP: Primary | ICD-10-CM

## 2020-09-28 DIAGNOSIS — R68.81 EARLY SATIETY: ICD-10-CM

## 2020-09-28 DIAGNOSIS — C66.2 CARCINOMA OF LEFT URETER (HCC): ICD-10-CM

## 2020-09-28 DIAGNOSIS — N18.30 ANEMIA DUE TO STAGE 3 CHRONIC KIDNEY DISEASE (HCC): ICD-10-CM

## 2020-09-28 DIAGNOSIS — D63.1 ANEMIA DUE TO STAGE 3 CHRONIC KIDNEY DISEASE (HCC): ICD-10-CM

## 2020-09-28 DIAGNOSIS — K29.00 ACUTE SUPERFICIAL GASTRITIS WITHOUT HEMORRHAGE: ICD-10-CM

## 2020-09-28 PROCEDURE — 99214 OFFICE O/P EST MOD 30 MIN: CPT | Performed by: FAMILY MEDICINE

## 2020-09-28 NOTE — PATIENT INSTRUCTIONS
Continue to avoid all anti-inflammatories  Follow back up with GI  Continue current treatment plan.

## 2020-09-28 NOTE — PROGRESS NOTES
Subjective   Enma Mckinney is a 70 y.o. female.     Vitals:    09/28/20 1527   BP: 102/66   Pulse: 85   Temp: 98.2 °F (36.8 °C)   SpO2: 98%        Chief Complaint   Patient presents with   • Chronic Kidney Disease     S/P left kidney removed and bladder surgery following up from last vist masdk and goggles worn   • Anemia        History of Present Illness    Hospital follow-up and 2-month follow-up for chronic anemia, CKD, and gastritis    LOV with me in June for recurrent UTI with hematuria.  Thus, patient was referred to urologist for further work-up.  She was also sent to GI given persistent gastritis.  See below.    Hospital admit 8/12/2020 through 8/14/2020 at [facility] in [facility]?  Per patient report.  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  Urologist/Dr. Castellanos.   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  Stent was placed and then plan for definitive surgery in August.  Her hospital course was uncomplicated.  She states she went home on day 3 with a catheter which has since been removed.  She was recently seen in follow-up and stated no further chemotherapy would be needed.  She was told to follow-up with her urologist in 3 months/November.    She was also seen by GI/Dr. Dale in late June 2020 for persistent gastritis, dyspepsia despite discontinuation of all anti-inflammatories and treatment with Protonix.  At that outpatient visit the decision was made to hold on any further endoscopy given patient was currently being worked up with CAT scans for a renal cell mass.  She has yet to follow back up with her GI specialist.  She continues to remain on her Protonix and avoid all anti-inflammatories.  However, unfortunately her appetite still has not resumed back to  normal and she reports early satiety.  Weight is relatively stable over the last few months.  She is trying to maintain 2000 esmer; however, states this is hard to do.   Denies abdominal pain, nausea, or vomiting.  Denies melena or rectal bleeding.  States she had a colonoscopy approximately 3 years ago which was normal.  No upper endoscopy.    Anemia has been stable.  Currently on iron 325 mg 1 p.o. daily.  Avoids all anti-inflammatories.  Discharge hemoglobin on August 14, 2020 was 9.7    The following portions of the patient's history were reviewed and updated as appropriate: allergies, current medications, past family history, past medical history, past social history, past surgical history and problem list.    Review of Systems   Constitutional: Positive for fatigue. Negative for fever, unexpected weight gain and unexpected weight loss.   Respiratory: Negative for cough and shortness of breath.    Cardiovascular: Negative for chest pain.   Gastrointestinal: Negative for abdominal pain, nausea and vomiting.   I have reviewed and confirmed the accuracy of the ROS as documented by the MA/LPN/RN Evelia Baldwin MD      Objective   Physical Exam  Vitals signs and nursing note reviewed.   Constitutional:       Appearance: Normal appearance. She is well-developed and normal weight.   HENT:      Head: Normocephalic and atraumatic.      Nose: Nose normal.   Eyes:      Conjunctiva/sclera: Conjunctivae normal.      Pupils: Pupils are equal, round, and reactive to light.   Neck:      Musculoskeletal: Normal range of motion and neck supple.      Thyroid: No thyromegaly.   Cardiovascular:      Rate and Rhythm: Normal rate and regular rhythm.      Heart sounds: Normal heart sounds. No murmur.   Pulmonary:      Effort: Pulmonary effort is normal.      Breath sounds: Normal breath sounds.   Abdominal:      General: Abdomen is flat. Bowel sounds are normal. There is no distension.      Palpations: Abdomen is soft. There is no  hepatomegaly, splenomegaly or mass.      Tenderness: There is no abdominal tenderness. There is no guarding or rebound.      Hernia: No hernia is present.   Musculoskeletal: Normal range of motion.      Right lower leg: No edema.      Left lower leg: No edema.   Lymphadenopathy:      Cervical: No cervical adenopathy.   Skin:     General: Skin is warm.   Neurological:      General: No focal deficit present.      Mental Status: She is alert.   Psychiatric:         Mood and Affect: Mood normal.         Behavior: Behavior normal.         Thought Content: Thought content normal.         Judgment: Judgment normal.          LABS/STUDIES --August 2020--discharge hemoglobin 9.7, discharge creatinine 1.3    Procedures     Assessment/Plan   Enma was seen today for chronic kidney disease and anemia.    Diagnoses and all orders for this visit:    Hospital discharge follow-up --secondary to renal cell and ureteral carcinoma.  S/P resection    Carcinoma of left kidney (CMS/HCC) -S/P recent resection, per urologist  -     Basic Metabolic Panel    Carcinoma of left ureter (CMS/HCC) S/P recent resection, per urologist  -     Basic Metabolic Panel    Anemia due to stage 3 chronic kidney disease (CMS/HCC) --stable?  Recheck BMP and CBC today.  Further recommendations to follow, may need referral to nephrologist?  Continue to avoid all anti-inflammatories.  Continue iron as prescribed.  -     Basic Metabolic Panel  -     CBC & Differential    Acute superficial gastritis without hemorrhage --still slightly uncontrolled.  Despite avoidance of all anti-inflammatories and continued PPI use still continues with lack of appetite and early satiety.  H. pylori gastritis??  Will recheck CBC today.  Have asked patient to follow-up with her GI specialist/Dr. Dale in near future as further evaluation may be indicated.  Will recheck CBC today, continue to avoid all anti-inflammatories and may continue iron as prescribed.  -     CBC &  Differential    Early satiety --uncontrolled.  Again, recommend following back up with GI as upper endoscopy may be indicated.                 Wore mask and face shield during entire patient visit.    Return in about 3 months (around 12/28/2020) for Medicare Wellness.

## 2020-09-29 LAB
BASOPHILS # BLD AUTO: 0 X10E3/UL (ref 0–0.2)
BASOPHILS NFR BLD AUTO: 0 %
BUN SERPL-MCNC: 24 MG/DL (ref 8–27)
BUN/CREAT SERPL: 17 (ref 12–28)
CALCIUM SERPL-MCNC: 9.4 MG/DL (ref 8.7–10.3)
CHLORIDE SERPL-SCNC: 101 MMOL/L (ref 96–106)
CO2 SERPL-SCNC: 28 MMOL/L (ref 20–29)
CREAT SERPL-MCNC: 1.38 MG/DL (ref 0.57–1)
EOSINOPHIL # BLD AUTO: 0.1 X10E3/UL (ref 0–0.4)
EOSINOPHIL NFR BLD AUTO: 1 %
ERYTHROCYTE [DISTWIDTH] IN BLOOD BY AUTOMATED COUNT: 13 % (ref 11.7–15.4)
GLUCOSE SERPL-MCNC: 115 MG/DL (ref 65–99)
HCT VFR BLD AUTO: 36.7 % (ref 34–46.6)
HGB BLD-MCNC: 11.9 G/DL (ref 11.1–15.9)
IMM GRANULOCYTES # BLD AUTO: 0 X10E3/UL (ref 0–0.1)
IMM GRANULOCYTES NFR BLD AUTO: 0 %
LYMPHOCYTES # BLD AUTO: 0.8 X10E3/UL (ref 0.7–3.1)
LYMPHOCYTES NFR BLD AUTO: 13 %
MCH RBC QN AUTO: 29 PG (ref 26.6–33)
MCHC RBC AUTO-ENTMCNC: 32.4 G/DL (ref 31.5–35.7)
MCV RBC AUTO: 89 FL (ref 79–97)
MONOCYTES # BLD AUTO: 0.5 X10E3/UL (ref 0.1–0.9)
MONOCYTES NFR BLD AUTO: 8 %
NEUTROPHILS # BLD AUTO: 4.8 X10E3/UL (ref 1.4–7)
NEUTROPHILS NFR BLD AUTO: 78 %
PLATELET # BLD AUTO: 234 X10E3/UL (ref 150–450)
POTASSIUM SERPL-SCNC: 5 MMOL/L (ref 3.5–5.2)
RBC # BLD AUTO: 4.11 X10E6/UL (ref 3.77–5.28)
SODIUM SERPL-SCNC: 142 MMOL/L (ref 134–144)
WBC # BLD AUTO: 6.2 X10E3/UL (ref 3.4–10.8)

## 2020-09-30 LAB
LAB AP CASE REPORT: NORMAL
LAB AP SYNOPTIC CHECKLIST: NORMAL
PATH REPORT.FINAL DX SPEC: NORMAL
PATH REPORT.GROSS SPEC: NORMAL

## 2020-10-01 ENCOUNTER — TELEPHONE (OUTPATIENT)
Dept: FAMILY MEDICINE CLINIC | Facility: CLINIC | Age: 70
End: 2020-10-01

## 2020-10-01 NOTE — TELEPHONE ENCOUNTER
PATIENT CALLING TO KNOW IF SHE NEEDS TO HAVE A PNEUMONIA OR SHINGLES SHOT.      PLEASE CALL AND ADVISE PATIENT -[phone]

## 2020-10-02 NOTE — TELEPHONE ENCOUNTER
Cording to the computer, she is due for shingles and Pneumovax 23.  However, I have no clue if she is already had this done at [place] as I do not have time to go searching through everyone in my [place] progress notes in the media looking to see when vaccinations were done, as immunization records half the time have not been scanned in appropriately.  Therefore, I will be glad to review the records in depth at her next follow-up for wellness.  However, if she knows for certain she has not had a pneumonia/Pneumovax 23 yet then she may obtain this as well otherwise, if she does not know that we need to review further at next appointment

## 2020-10-06 ENCOUNTER — TELEPHONE (OUTPATIENT)
Dept: FAMILY MEDICINE CLINIC | Facility: CLINIC | Age: 70
End: 2020-10-06

## 2020-10-06 NOTE — TELEPHONE ENCOUNTER
Patient is calling to state she has previously seen a Dr. Hernandez, Gastroenterology since she has gone to him for several years.  She states she likes Dr. Dale, but just because she has been with Dr. Hernandez so long, she wants to keep him.    Patient thinks she can schedule her appointment without a referral.    Please advise.    Patient call back [phone]

## 2020-10-06 NOTE — TELEPHONE ENCOUNTER
Spoke with patient she will call back if she has any issues getting in with Dr Hernandez just prefers to stay with him

## 2021-02-07 RX ORDER — PANTOPRAZOLE SODIUM 40 MG/1
TABLET, DELAYED RELEASE ORAL
Qty: 90 TABLET | Refills: 1 | Status: SHIPPED | OUTPATIENT
Start: 2021-02-07 | End: 2021-08-02

## 2021-03-09 DIAGNOSIS — Z23 IMMUNIZATION DUE: ICD-10-CM

## 2021-08-02 RX ORDER — PANTOPRAZOLE SODIUM 40 MG/1
TABLET, DELAYED RELEASE ORAL
Qty: 90 TABLET | Refills: 1 | Status: SHIPPED | OUTPATIENT
Start: 2021-08-02 | End: 2022-07-22 | Stop reason: SDUPTHER

## 2022-04-26 ENCOUNTER — TELEPHONE (OUTPATIENT)
Dept: FAMILY MEDICINE CLINIC | Facility: CLINIC | Age: 72
End: 2022-04-26

## 2022-06-20 ENCOUNTER — TELEPHONE (OUTPATIENT)
Dept: FAMILY MEDICINE CLINIC | Facility: CLINIC | Age: 72
End: 2022-06-20

## 2022-06-20 NOTE — TELEPHONE ENCOUNTER
Caller: Enma Mckinney    Relationship: Self    Best call back number: [phone]       What was the call regarding: PATIENT CALLED AND MADE A WELLNESS VISIT WITH DERICK SANCHEZ.  DID NOT HAVE ANY OPENINGS UNTIL NOVEMBER.

## 2022-07-22 ENCOUNTER — OFFICE VISIT (OUTPATIENT)
Dept: FAMILY MEDICINE CLINIC | Facility: CLINIC | Age: 72
End: 2022-07-22

## 2022-07-22 VITALS
TEMPERATURE: 97.1 F | OXYGEN SATURATION: 98 % | HEIGHT: 63 IN | WEIGHT: 129 LBS | HEART RATE: 92 BPM | BODY MASS INDEX: 22.86 KG/M2 | SYSTOLIC BLOOD PRESSURE: 118 MMHG | DIASTOLIC BLOOD PRESSURE: 72 MMHG

## 2022-07-22 DIAGNOSIS — Z13.220 SCREENING FOR LIPID DISORDERS: ICD-10-CM

## 2022-07-22 DIAGNOSIS — Z79.811 AROMATASE INHIBITOR USE: ICD-10-CM

## 2022-07-22 DIAGNOSIS — Z11.59 NEED FOR HEPATITIS C SCREENING TEST: ICD-10-CM

## 2022-07-22 DIAGNOSIS — Z78.0 POST-MENOPAUSAL: ICD-10-CM

## 2022-07-22 DIAGNOSIS — E55.9 VITAMIN D DEFICIENCY, UNSPECIFIED: ICD-10-CM

## 2022-07-22 DIAGNOSIS — Z13.820 SCREENING FOR OSTEOPOROSIS: ICD-10-CM

## 2022-07-22 DIAGNOSIS — D63.1 ANEMIA DUE TO STAGE 3 CHRONIC KIDNEY DISEASE, UNSPECIFIED WHETHER STAGE 3A OR 3B CKD: ICD-10-CM

## 2022-07-22 DIAGNOSIS — K29.00 ACUTE SUPERFICIAL GASTRITIS WITHOUT HEMORRHAGE: ICD-10-CM

## 2022-07-22 DIAGNOSIS — Z13.1 SCREENING FOR DIABETES MELLITUS: ICD-10-CM

## 2022-07-22 DIAGNOSIS — E53.9 VITAMIN B DEFICIENCY: ICD-10-CM

## 2022-07-22 DIAGNOSIS — Z00.00 MEDICARE ANNUAL WELLNESS VISIT, SUBSEQUENT: Primary | ICD-10-CM

## 2022-07-22 DIAGNOSIS — N18.30 ANEMIA DUE TO STAGE 3 CHRONIC KIDNEY DISEASE, UNSPECIFIED WHETHER STAGE 3A OR 3B CKD: ICD-10-CM

## 2022-07-22 PROCEDURE — 1160F RVW MEDS BY RX/DR IN RCRD: CPT | Performed by: NURSE PRACTITIONER

## 2022-07-22 PROCEDURE — 1170F FXNL STATUS ASSESSED: CPT | Performed by: NURSE PRACTITIONER

## 2022-07-22 PROCEDURE — G0439 PPPS, SUBSEQ VISIT: HCPCS | Performed by: NURSE PRACTITIONER

## 2022-07-22 PROCEDURE — 96160 PT-FOCUSED HLTH RISK ASSMT: CPT | Performed by: NURSE PRACTITIONER

## 2022-07-22 RX ORDER — PANTOPRAZOLE SODIUM 40 MG/1
40 TABLET, DELAYED RELEASE ORAL DAILY
Qty: 90 TABLET | Refills: 0 | Status: SHIPPED | OUTPATIENT
Start: 2022-07-22 | End: 2022-10-20

## 2022-07-22 NOTE — PROGRESS NOTES
The ABCs of the Annual Wellness Visit  Subsequent Medicare Wellness Visit    Masks/face shield/appropriate PPE were worn for the entirety of the visit by the patient, MA, and provider.     Chief Complaint   Patient presents with   • Medicare Wellness-subsequent     SMW - fasting       Subjective    History of Present Illness:  Enma Mckinney is a 72 y.o. female who presents for a Subsequent Medicare Wellness Visit.  Patient has a history of left kidney carcinoma with left nephrectomy.  She also has a history of chronic kidney disease and associated anemia.  She has a history of breast cancer and is currently on anastrozole.  Patient follows with Dr. Evelia Baldwin for primary care.    The following portions of the patient's history were reviewed and   updated as appropriate: allergies, current medications, past family history, past medical history, past social history, past surgical history and problem list.    Compared to one year ago, the patient feels her physical   health is better.    Compared to one year ago, the patient feels her mental   health is better.    Recent Hospitalizations:  She was not admitted to the hospital during the last year.       Current Medical Providers:  Patient Care Team:  Evelia Baldwin MD as PCP - General (Family Medicine)    Outpatient Medications Prior to Visit   Medication Sig Dispense Refill   • anastrozole (ARIMIDEX) 1 MG tablet Take 1 mg by mouth Daily.     • APPLE CIDER VINEGAR PO Take  by mouth Daily.     • Calcium Carbonate-Vitamin D 600-200 MG-UNIT tablet Take  by mouth.     • ferrous sulfate 325 (65 Fe) MG tablet Take  by mouth Daily.     • vitamin B-12 (CYANOCOBALAMIN) 100 MCG tablet Take 50 mcg by mouth Daily.     • pantoprazole (PROTONIX) 40 MG EC tablet TAKE 1 TABLET BY MOUTH EVERY DAY 90 tablet 1     No facility-administered medications prior to visit.       No opioid medication identified on active medication list. I have reviewed chart for other potential  high  "risk medication/s and harmful drug interactions in the elderly.          Aspirin is not on active medication list.  Aspirin use is not indicated based on review of current medical condition/s. Risk of harm outweighs potential benefits.  .    Patient Active Problem List   Diagnosis   • History of right breast cancer   • Malignant neoplasm of upper-outer quadrant of right breast in female, estrogen receptor positive (HCC)   • Meniere's disease   • Visit for monitoring Arimidex therapy   • Anemia due to stage 3 chronic kidney disease (HCC)   • Acute superficial gastritis without hemorrhage   • Recurrent UTI   • Recurrent and persistent hematuria   • Carcinoma of left ureter (HCC)   • Carcinoma of left kidney (HCC)     Advance Care Planning  Advance Directive is not on file.  ACP discussion was held with the patient during this visit. Patient does not have an advance directive, declines further assistance.    Review of Systems   Constitutional: Negative.    HENT: Negative.    Eyes: Negative.    Cardiovascular: Negative.    Gastrointestinal: Negative.    Endocrine: Negative.    Genitourinary: Negative.    Musculoskeletal: Negative.    Skin: Negative.    Neurological: Negative.    Psychiatric/Behavioral: Negative.         Objective    Vitals:    07/22/22 1304   BP: 118/72   Pulse: 92   Temp: 97.1 °F (36.2 °C)   SpO2: 98%   Weight: 58.5 kg (129 lb)   Height: 160 cm (63\")     Estimated body mass index is 22.85 kg/m² as calculated from the following:    Height as of this encounter: 160 cm (63\").    Weight as of this encounter: 58.5 kg (129 lb).    BMI is within normal parameters. No other follow-up for BMI required.      Does the patient have evidence of cognitive impairment? No    Physical Exam  Vitals reviewed.   Constitutional:       General: She is not in acute distress.     Appearance: Normal appearance. She is well-developed. She is not ill-appearing, toxic-appearing or diaphoretic.   HENT:      Head: Normocephalic and " atraumatic.   Eyes:      General: No scleral icterus.        Right eye: No discharge.         Left eye: No discharge.      Extraocular Movements: Extraocular movements intact.   Neck:      Thyroid: No thyroid mass, thyromegaly or thyroid tenderness.   Cardiovascular:      Rate and Rhythm: Normal rate and regular rhythm.      Heart sounds: Normal heart sounds.   Pulmonary:      Effort: Pulmonary effort is normal. No respiratory distress.      Breath sounds: Normal breath sounds.   Abdominal:      General: Bowel sounds are normal.      Palpations: Abdomen is soft.   Musculoskeletal:         General: Normal range of motion.      Cervical back: Normal range of motion.      Right lower leg: No edema.      Left lower leg: No edema.   Skin:     General: Skin is warm.   Neurological:      General: No focal deficit present.      Mental Status: She is alert and oriented to person, place, and time.   Psychiatric:         Behavior: Behavior normal.       Lab Results   Component Value Date    CHLPL 222 (H) 07/22/2022    TRIG 85 07/22/2022    HDL 74 07/22/2022     (H) 07/22/2022    VLDL 15 07/22/2022            HEALTH RISK ASSESSMENT    Smoking Status:  Social History     Tobacco Use   Smoking Status Never Smoker   Smokeless Tobacco Never Used     Alcohol Consumption:  Social History     Substance and Sexual Activity   Alcohol Use Yes    Comment: occ glass of wine      Fall Risk Screen:    CHARITY Fall Risk Assessment was completed, and patient is at LOW risk for falls.Assessment completed on:7/22/2022    Depression Screening:  PHQ-2/PHQ-9 Depression Screening 7/22/2022   Little Interest or Pleasure in Doing Things 0-->not at all   Feeling Down, Depressed or Hopeless 0-->not at all   PHQ-9: Brief Depression Severity Measure Score 0       Health Habits and Functional and Cognitive Screening:  Functional & Cognitive Status 7/22/2022   Do you have difficulty preparing food and eating? No   Do you have difficulty bathing  yourself, getting dressed or grooming yourself? No   Do you have difficulty using the toilet? No   Do you have difficulty moving around from place to place? No   Do you have trouble with steps or getting out of a bed or a chair? No   Current Diet Well Balanced Diet   Dental Exam Up to date   Eye Exam Up to date   Exercise (times per week) 7 times per week   Current Exercises Include (No Data)        Exercise Comment working   Do you need help using the phone?  No   Are you deaf or do you have serious difficulty hearing?  No   Do you need help with transportation? Yes   Do you need help shopping? No   Do you need help preparing meals?  No   Do you need help with housework?  No   Do you need help with laundry? No   Do you need help taking your medications? No   Do you need help managing money? No   Do you ever drive or ride in a car without wearing a seat belt? No   Have you felt unusual stress, anger or loneliness in the last month? No   Who do you live with? Spouse   If you need help, do you have trouble finding someone available to you? No   Have you been bothered in the last four weeks by sexual problems? No   Do you have difficulty concentrating, remembering or making decisions? No       Age-appropriate Screening Schedule:  Refer to the list below for future screening recommendations based on patient's age, sex and/or medical conditions. Orders for these recommended tests are listed in the plan section. The patient has been provided with a written plan.    Health Maintenance   Topic Date Due   • TDAP/TD VACCINES (1 - Tdap) Never done   • ZOSTER VACCINE (1 of 2) Never done   • DXA SCAN  01/16/2022   • MAMMOGRAM  09/23/2022   • INFLUENZA VACCINE  10/01/2022              Assessment & Plan   CMS Preventative Services Quick Reference  Risk Factors Identified During Encounter  Immunizations Discussed/Encouraged (specific Immunizations; Tdap, Shingrix and COVID19  The above risks/problems have been discussed with the  patient.  Follow up actions/plans if indicated are seen below in the Assessment/Plan Section.  Pertinent information has been shared with the patient in the After Visit Summary.    Diagnoses and all orders for this visit:    1. Medicare annual wellness visit, subsequent (Primary)    2. Acute superficial gastritis without hemorrhage  -     pantoprazole (PROTONIX) 40 MG EC tablet; Take 1 tablet by mouth Daily.  Dispense: 90 tablet; Refill: 0    3. Aromatase inhibitor use  -     DEXA Bone Density Axial; Future  -     Vitamin D 25 Hydroxy    4. Post-menopausal  -     DEXA Bone Density Axial; Future    5. Screening for osteoporosis  -     DEXA Bone Density Axial; Future    6. Anemia due to stage 3 chronic kidney disease, unspecified whether stage 3a or 3b CKD (HCC)  -     CBC w AUTO Differential    7. Screening for lipid disorders  -     Lipid Panel    8. Screening for diabetes mellitus  -     Comprehensive metabolic panel    9. Vitamin B deficiency  -     Vitamin B12    10. Need for hepatitis C screening test  -     Hepatitis C antibody    11. Vitamin D deficiency, unspecified   -     Vitamin D 25 Hydroxy      Patient is a pleasant 72-year-old female here today for her annual Medicare wellness visit.  Patient has no concerns today.  Vital signs are within normal limits.  Patient maintains a well-balanced diet and active lifestyle. Routine blood work ordered today.  Patient follows with urologist, Dr. Castellanos routinely.     Health maintenance:  · Colon cancer screening: Colonoscopy last performed in August 2018.  Repeat colonoscopy recommended August 2028.  · Breast cancer screening: Last mammogram performed September 2021.  Patient is due in September 2022.  Patient is currently on anastrozole.  She routinely follows with her oncologist.  · Osteoporosis screening: Patient had a bone density scan performed in January 2020 that showed osteopenia.  Patient is currently on anastrozole and postmenopausal.  Bone density scan  ordered today for routine evaluation.  · Immunizations: Patient is due for Tdap, shingles vaccine, and second COVID-19 booster vaccine.    Follow Up:   Return in about 1 year (around 7/22/2023) for Medicare Wellness.  Patient may require closer follow-up appointment depending on lab results.     An After Visit Summary and PPPS were made available to the patient.                   Electronically signed by DERICK Grant, 07/26/22, 2:14 PM EDT.

## 2022-07-23 LAB
25(OH)D3+25(OH)D2 SERPL-MCNC: 37.3 NG/ML (ref 30–100)
ALBUMIN SERPL-MCNC: 4.1 G/DL (ref 3.7–4.7)
ALBUMIN/GLOB SERPL: 1.7 {RATIO} (ref 1.2–2.2)
ALP SERPL-CCNC: 59 IU/L (ref 44–121)
ALT SERPL-CCNC: 19 IU/L (ref 0–32)
AST SERPL-CCNC: 21 IU/L (ref 0–40)
BASOPHILS # BLD AUTO: 0 X10E3/UL (ref 0–0.2)
BASOPHILS NFR BLD AUTO: 0 %
BILIRUB SERPL-MCNC: 0.3 MG/DL (ref 0–1.2)
BUN SERPL-MCNC: 23 MG/DL (ref 8–27)
BUN/CREAT SERPL: 16 (ref 12–28)
CALCIUM SERPL-MCNC: 9.2 MG/DL (ref 8.7–10.3)
CHLORIDE SERPL-SCNC: 105 MMOL/L (ref 96–106)
CHOLEST SERPL-MCNC: 222 MG/DL (ref 100–199)
CO2 SERPL-SCNC: 26 MMOL/L (ref 20–29)
CREAT SERPL-MCNC: 1.4 MG/DL (ref 0.57–1)
EGFRCR SERPLBLD CKD-EPI 2021: 39 ML/MIN/1.73
EOSINOPHIL # BLD AUTO: 0.1 X10E3/UL (ref 0–0.4)
EOSINOPHIL NFR BLD AUTO: 2 %
ERYTHROCYTE [DISTWIDTH] IN BLOOD BY AUTOMATED COUNT: 12.7 % (ref 11.7–15.4)
GLOBULIN SER CALC-MCNC: 2.4 G/DL (ref 1.5–4.5)
GLUCOSE SERPL-MCNC: 81 MG/DL (ref 65–99)
HCT VFR BLD AUTO: 41.9 % (ref 34–46.6)
HCV AB S/CO SERPL IA: <0.1 S/CO RATIO (ref 0–0.9)
HDLC SERPL-MCNC: 74 MG/DL
HGB BLD-MCNC: 13.8 G/DL (ref 11.1–15.9)
IMM GRANULOCYTES # BLD AUTO: 0 X10E3/UL (ref 0–0.1)
IMM GRANULOCYTES NFR BLD AUTO: 0 %
LDLC SERPL CALC-MCNC: 133 MG/DL (ref 0–99)
LYMPHOCYTES # BLD AUTO: 0.9 X10E3/UL (ref 0.7–3.1)
LYMPHOCYTES NFR BLD AUTO: 14 %
MCH RBC QN AUTO: 29.8 PG (ref 26.6–33)
MCHC RBC AUTO-ENTMCNC: 32.9 G/DL (ref 31.5–35.7)
MCV RBC AUTO: 91 FL (ref 79–97)
MONOCYTES # BLD AUTO: 0.5 X10E3/UL (ref 0.1–0.9)
MONOCYTES NFR BLD AUTO: 7 %
NEUTROPHILS # BLD AUTO: 5 X10E3/UL (ref 1.4–7)
NEUTROPHILS NFR BLD AUTO: 77 %
PLATELET # BLD AUTO: 181 X10E3/UL (ref 150–450)
POTASSIUM SERPL-SCNC: 5.7 MMOL/L (ref 3.5–5.2)
PROT SERPL-MCNC: 6.5 G/DL (ref 6–8.5)
RBC # BLD AUTO: 4.63 X10E6/UL (ref 3.77–5.28)
SODIUM SERPL-SCNC: 144 MMOL/L (ref 134–144)
TRIGL SERPL-MCNC: 85 MG/DL (ref 0–149)
VIT B12 SERPL-MCNC: >2000 PG/ML (ref 232–1245)
VLDLC SERPL CALC-MCNC: 15 MG/DL (ref 5–40)
WBC # BLD AUTO: 6.5 X10E3/UL (ref 3.4–10.8)

## 2022-07-25 DIAGNOSIS — E78.41 ELEVATED LIPOPROTEIN(A): Primary | ICD-10-CM

## 2022-07-25 DIAGNOSIS — E87.5 SERUM POTASSIUM ELEVATED: Primary | ICD-10-CM

## 2022-07-25 RX ORDER — ATORVASTATIN CALCIUM 10 MG/1
10 TABLET, FILM COATED ORAL DAILY
Qty: 90 TABLET | Refills: 0 | Status: SHIPPED | OUTPATIENT
Start: 2022-07-25 | End: 2022-10-24

## 2022-08-25 ENCOUNTER — HOSPITAL ENCOUNTER (OUTPATIENT)
Dept: BONE DENSITY | Facility: HOSPITAL | Age: 72
Discharge: HOME | End: 2022-08-25
Admitting: NURSE PRACTITIONER

## 2022-08-25 DIAGNOSIS — Z78.0 POST-MENOPAUSAL: ICD-10-CM

## 2022-08-25 DIAGNOSIS — Z13.820 SCREENING FOR OSTEOPOROSIS: ICD-10-CM

## 2022-08-25 DIAGNOSIS — Z79.811 AROMATASE INHIBITOR USE: ICD-10-CM

## 2022-08-25 PROCEDURE — 77080 DXA BONE DENSITY AXIAL: CPT

## 2022-08-31 DIAGNOSIS — Z85.528 HISTORY OF KIDNEY CANCER: ICD-10-CM

## 2022-08-31 DIAGNOSIS — R79.89 ELEVATED SERUM CREATININE: Primary | ICD-10-CM

## 2022-10-20 DIAGNOSIS — K29.00 ACUTE SUPERFICIAL GASTRITIS WITHOUT HEMORRHAGE: ICD-10-CM

## 2022-10-20 RX ORDER — PANTOPRAZOLE SODIUM 40 MG/1
TABLET, DELAYED RELEASE ORAL
Qty: 90 TABLET | Refills: 0 | Status: SHIPPED | OUTPATIENT
Start: 2022-10-20 | End: 2023-01-23

## 2022-10-22 DIAGNOSIS — E78.41 ELEVATED LIPOPROTEIN(A): ICD-10-CM

## 2022-10-24 RX ORDER — ATORVASTATIN CALCIUM 10 MG/1
TABLET, FILM COATED ORAL
Qty: 90 TABLET | Refills: 0 | Status: SHIPPED | OUTPATIENT
Start: 2022-10-24 | End: 2023-01-23

## 2023-01-22 DIAGNOSIS — E78.41 ELEVATED LIPOPROTEIN(A): ICD-10-CM

## 2023-01-22 DIAGNOSIS — K29.00 ACUTE SUPERFICIAL GASTRITIS WITHOUT HEMORRHAGE: ICD-10-CM

## 2023-01-23 RX ORDER — PANTOPRAZOLE SODIUM 40 MG/1
TABLET, DELAYED RELEASE ORAL
Qty: 90 TABLET | Refills: 0 | Status: SHIPPED | OUTPATIENT
Start: 2023-01-23

## 2023-01-23 RX ORDER — ATORVASTATIN CALCIUM 10 MG/1
TABLET, FILM COATED ORAL
Qty: 90 TABLET | Refills: 0 | Status: SHIPPED | OUTPATIENT
Start: 2023-01-23

## 2023-04-10 DIAGNOSIS — E78.41 ELEVATED LIPOPROTEIN(A): ICD-10-CM

## 2023-04-10 DIAGNOSIS — K29.00 ACUTE SUPERFICIAL GASTRITIS WITHOUT HEMORRHAGE: ICD-10-CM

## 2023-04-10 RX ORDER — PANTOPRAZOLE SODIUM 40 MG/1
TABLET, DELAYED RELEASE ORAL
Qty: 90 TABLET | Refills: 0 | Status: SHIPPED | OUTPATIENT
Start: 2023-04-10

## 2023-04-10 RX ORDER — ATORVASTATIN CALCIUM 10 MG/1
TABLET, FILM COATED ORAL
Qty: 90 TABLET | Refills: 0 | Status: SHIPPED | OUTPATIENT
Start: 2023-04-10

## 2023-08-01 ENCOUNTER — OFFICE VISIT (OUTPATIENT)
Dept: FAMILY MEDICINE CLINIC | Facility: CLINIC | Age: 73
End: 2023-08-01
Payer: MEDICARE

## 2023-08-01 VITALS
HEIGHT: 62 IN | TEMPERATURE: 97.5 F | DIASTOLIC BLOOD PRESSURE: 78 MMHG | SYSTOLIC BLOOD PRESSURE: 128 MMHG | HEART RATE: 76 BPM | OXYGEN SATURATION: 98 % | WEIGHT: 122.4 LBS | BODY MASS INDEX: 22.52 KG/M2

## 2023-08-01 DIAGNOSIS — E78.5 HYPERLIPIDEMIA, UNSPECIFIED HYPERLIPIDEMIA TYPE: ICD-10-CM

## 2023-08-01 DIAGNOSIS — N18.32 STAGE 3B CHRONIC KIDNEY DISEASE: ICD-10-CM

## 2023-08-01 DIAGNOSIS — Z85.51 HISTORY OF BLADDER CANCER: ICD-10-CM

## 2023-08-01 DIAGNOSIS — C64.2 CARCINOMA OF LEFT KIDNEY: ICD-10-CM

## 2023-08-01 DIAGNOSIS — Z85.3 HISTORY OF RIGHT BREAST CANCER: ICD-10-CM

## 2023-08-01 DIAGNOSIS — Z00.00 ENCOUNTER FOR MEDICARE ANNUAL WELLNESS EXAM: Primary | ICD-10-CM

## 2023-08-01 DIAGNOSIS — K29.50 CHRONIC GASTRITIS WITHOUT BLEEDING, UNSPECIFIED GASTRITIS TYPE: ICD-10-CM

## 2023-08-01 RX ORDER — ATORVASTATIN CALCIUM 10 MG/1
10 TABLET, FILM COATED ORAL DAILY
Qty: 90 TABLET | Refills: 1 | Status: SHIPPED | OUTPATIENT
Start: 2023-08-01

## 2023-08-01 NOTE — PROGRESS NOTES
"The ABCs of the Annual Wellness Visit  Subsequent Medicare Wellness Visit    Chief Complaint   Patient presents with    Medicare Wellness-subsequent     Yearly wellness visit patient is fasting last mammogram 10/2022 and bone density done 8/2022 had colonoscopy done 2018    Hyperlipidemia     Last seen by us 3 years ago has seen multiple specialists with no follow up      Subjective    History of Present Illness:  Enam Mckinney is a 73 y.o. female who presents for a Subsequent Medicare Wellness Visit.    NEEDS ANNUAL WELLNESS  And  Over 1 year F/U on hyperlipidemia, CKD, hyperkalemia, CKD, and multiple specialist     LOV with me 3 years ago!  At that time diagnosed with new kidney and bladder cancer and had uncontrolled gastritis. Referred to urologist and GI.  She has been seeing urologist routinely and having cystoscopes and CT scans, records not available.  Last seen GI/David almost 3 years ago in Dec 2020 has an EGD and told \"ok\", no records!  Also, has not seen her onc/Burke in almost 2 years and on Arimedex!    Most recently seen her a year ago with NP/Lorrie for wellness and uncontrolled lipids.  Started on Lipitor and told to follow up in 3 months,but never did!  Also, noted to have some decline in renal function and hyperK. Told to repeat labs in 3 mos, but NO F/u    Currently, doing okay.  Tries to maintain a healthy diet and active lifestyle.  Reports her weight to be stable. Sleep is fine. Mood good.   No abd pain, N/V, CP, SOA    No new complaints or concerns just needs follow up on multiple issues.  Overdue for fasting labs  Compliant with and tolerates all meds without side effects.  Does not take any NSaids    Routine health maintenance/screening test:  PAP --- N/A aged out  MAMMO --- Oct 2022  DEXA --- Aug 2022  Colorectal Screen --- Cscope 2018, thinks normal? No records  Vaccines --- not up to date  Smoking/ETOH Status --- nonsmoker, no EToh  Dentist, Eye Exam, Derm --- maintains regular " dental and eye doc, has derm too  Diet/Exercise --- healthy well balanced, regular exercise  Pertinent FH --- negative for colon cancer, significant for lung cancer/parent    The following portions of the patient's history were reviewed and   updated as appropriate: allergies, current medications, past family history, past medical history, past social history, past surgical history, and problem list.    Compared to one year ago, the patient feels her physical   health is the same.    Compared to one year ago, the patient feels her mental   health is the same.    Recent Hospitalizations:  She was not admitted to the hospital during the last year.       Current Medical Providers:  Patient Care Team:  Evelia Baldwin MD as PCP - General (Family Medicine)    Outpatient Medications Prior to Visit   Medication Sig Dispense Refill    anastrozole (ARIMIDEX) 1 MG tablet Take 1 tablet by mouth Daily.      APPLE CIDER VINEGAR PO Take  by mouth Daily.      Calcium Carbonate-Vitamin D 600-200 MG-UNIT tablet Take  by mouth.      ferrous sulfate 325 (65 Fe) MG tablet Take  by mouth Daily.      pantoprazole (PROTONIX) 40 MG EC tablet TAKE 1 TABLET BY MOUTH EVERY DAY 90 tablet 0    vitamin B-12 (CYANOCOBALAMIN) 100 MCG tablet Take 0.5 tablets by mouth Daily.      atorvastatin (LIPITOR) 10 MG tablet TAKE 1 TABLET BY MOUTH EVERY DAY 90 tablet 0     No facility-administered medications prior to visit.       No opioid medication identified on active medication list. I have reviewed chart for other potential  high risk medication/s and harmful drug interactions in the elderly.        Aspirin is not on active medication list.  Aspirin use is not indicated based on review of current medical condition/s. Risk of harm outweighs potential benefits.  .    Patient Active Problem List   Diagnosis    History of right breast cancer    Malignant neoplasm of upper-outer quadrant of right breast in female, estrogen receptor positive    Meniere's  "disease    Visit for monitoring Arimidex therapy    Anemia due to stage 3 chronic kidney disease    Acute superficial gastritis without hemorrhage    Recurrent UTI    Recurrent and persistent hematuria    Carcinoma of left ureter    Carcinoma of left kidney    Hyperlipidemia    Chronic gastritis without bleeding    History of bladder cancer    Stage 3b chronic kidney disease     Advance Care Planning  Advance Directive is not on file.  ACP discussion was held with the patient during this visit. Patient does not have an advance directive, information provided.    Review of Systems   Constitutional:  Negative for fever.   Respiratory:  Negative for cough and shortness of breath.    Cardiovascular:  Negative for chest pain.      Objective    Vitals:    08/01/23 0829   BP: 128/78   BP Location: Right arm   Patient Position: Sitting   Cuff Size: Adult   Pulse: 76   Temp: 97.5 øF (36.4 øC)   SpO2: 98%   Weight: 55.5 kg (122 lb 6.4 oz)   Height: 157.5 cm (62\")   PainSc: 0-No pain     BMI Readings from Last 1 Encounters:   08/01/23 22.39 kg/mý   BMI is within normal parameters. No follow-up required.    Does the patient have evidence of cognitive impairment? No    Physical Exam  Vitals and nursing note reviewed.   Constitutional:       Appearance: Normal appearance. She is well-developed.   HENT:      Head: Normocephalic and atraumatic.      Nose: Nose normal.   Eyes:      Conjunctiva/sclera: Conjunctivae normal.      Pupils: Pupils are equal, round, and reactive to light.   Neck:      Thyroid: No thyromegaly.   Cardiovascular:      Rate and Rhythm: Normal rate and regular rhythm.      Heart sounds: Normal heart sounds. No murmur heard.  Pulmonary:      Effort: Pulmonary effort is normal.      Breath sounds: Normal breath sounds.   Abdominal:      General: Abdomen is flat. Bowel sounds are normal. There is no distension.      Palpations: Abdomen is soft. There is no hepatomegaly, splenomegaly or mass.      Tenderness: There " is no abdominal tenderness. There is no guarding or rebound.      Hernia: No hernia is present.   Musculoskeletal:         General: Normal range of motion.      Cervical back: Normal range of motion and neck supple.      Right lower leg: No edema.      Left lower leg: No edema.   Lymphadenopathy:      Cervical: No cervical adenopathy.   Skin:     General: Skin is warm.   Neurological:      General: No focal deficit present.      Mental Status: She is alert.   Psychiatric:         Mood and Affect: Mood normal.         Behavior: Behavior normal.         Thought Content: Thought content normal.         Judgment: Judgment normal.     Common labs          8/18/2022    10:43 10/25/2022    13:13 8/1/2023    09:27   Common Labs   Glucose 87   90    BUN 21   26    Creatinine 1.49   1.42    Sodium 143   142    Potassium 4.6   5.3    Chloride 104   102    Calcium 8.9   9.4    Total Protein 6.2   6.4    Albumin 4.2   4.3    Total Bilirubin 0.4   0.4    Alkaline Phosphatase 57   54    AST (SGOT) 18   25    ALT (SGPT) 16   22    WBC  5.87     6.93    Hemoglobin  12.8     14.1    Hematocrit  40.2     42.5    Platelets  153     154    Total Cholesterol   176    Triglycerides   75    HDL Cholesterol   84    LDL Cholesterol    78       Details          This result is from an external source.             TSH          8/1/2023    09:27   TSH   TSH 1.230    October 2022 --- potassium 5.2, GFR 36, normal LFTs    Lipid Panel (07/22/2022 13:58)         Lab Results   Component Value Date    TSNY03XA 37.3 07/22/2022         UROLOGY - SCAN - [facility], 10/10/22 (10/10/2022)     HEALTH RISK ASSESSMENT    Smoking Status:  Social History     Tobacco Use   Smoking Status Never   Smokeless Tobacco Never     Alcohol Consumption:  Social History     Substance and Sexual Activity   Alcohol Use Yes    Comment: occ glass of wine      Fall Risk Screen:    STEADI Fall Risk Assessment was completed, and patient is at HIGH risk for falls.  Assessment completed on:2023    Depression Screenin/1/2023     8:34 AM   PHQ-2/PHQ-9 Depression Screening   Little Interest or Pleasure in Doing Things 0-->not at all   Feeling Down, Depressed or Hopeless 0-->not at all   PHQ-9: Brief Depression Severity Measure Score 0       Health Habits and Functional and Cognitive Screenin/1/2023     8:33 AM   Functional & Cognitive Status   Do you have difficulty preparing food and eating? No   Do you have difficulty bathing yourself, getting dressed or grooming yourself? No   Do you have difficulty using the toilet? No   Do you have difficulty moving around from place to place? No   Do you have trouble with steps or getting out of a bed or a chair? No   Current Diet Well Balanced Diet   Dental Exam Up to date   Eye Exam Up to date   Exercise (times per week) 7 times per week   Current Exercises Include Walking   Do you need help using the phone?  No   Are you deaf or do you have serious difficulty hearing?  No   Do you need help to go to places out of walking distance? No   Do you need help shopping? No   Do you need help preparing meals?  No   Do you need help with housework?  No   Do you need help with laundry? No   Do you need help taking your medications? No   Do you need help managing money? No   Do you ever drive or ride in a car without wearing a seat belt? No       Age-appropriate Screening Schedule:  Refer to the list below for future screening recommendations based on patient's age, sex and/or medical conditions. Orders for these recommended tests are listed in the plan section. The patient has been provided with a written plan.    Health Maintenance   Topic Date Due    TDAP/TD VACCINES (1 - Tdap) Never done    ZOSTER VACCINE (1 of 2) Never done    COVID-19 Vaccine (4 - Pfizer series) 2022    Pneumococcal Vaccine 65+ (2 - PCV) 2022    ANNUAL WELLNESS VISIT  2023    INFLUENZA VACCINE  10/01/2023    MAMMOGRAM  10/06/2023    LIPID  PANEL  08/01/2024    DXA SCAN  08/25/2024    COLORECTAL CANCER SCREENING  08/29/2028    HEPATITIS C SCREENING  Completed                   Assessment & Plan   CMS Preventative Services Quick Reference  Risk Factors Identified During Encounter  Immunizations Discussed/Encouraged: Tdap, Prevnar 20 (Pneumococcal 20-valent conjugate), Shingrix, and COVID19  The above risks/problems have been discussed with the patient.  Follow up actions/plans if indicated are seen below in the Assessment/Plan Section.  Pertinent information has been shared with the patient in the After Visit Summary.    Diagnoses and all orders for this visit:    1. Medicare wellness (Primary) -- S/P Subsequent  exam done, wnl  All screening up to date except needs labs listed below, Tdap, Prevnar 20, shingles, and Covid booster (will receive from pharmacy in near future.)  Have discussed and porvided info for a Living will  Otherwise, cont to improve upon healthy lifestyle--Needs low-carb/low calorie/low cholesterol diet and increase cardio exercise to greater than 150 minutes weekly   -     CBC & Differential    2. Hyperlipidemia, unspecified hyperlipidemia type uncontrolled--   Hopefully improved since starting Lipitor a year ago, no labs and recheck due to noncompliance with follow-up.  Recheck lipids, CMP, TSH if all WNL, plan to continue Lipitor 10 mg daily  Needs low-carb/low calorie/low cholesterol diet and increase cardio exercise to greater than 150 minutes weekly   -     Comprehensive Metabolic Panel  -     Lipid Panel With LDL / HDL Ratio  -     TSH  -     atorvastatin (LIPITOR) 10 MG tablet; Take 1 tablet by mouth Daily.  Dispense: 90 tablet; Refill: 1    3. Stage 3b chronic kidney disease --new Dx, stable?  Again, way overdue for follow-up on lab work due to noncompliance with follow-up recommendations (see above HPI.)  1 year ago had new onset CKD with some slight hyperkalemia.  Labs have not been rechecked since.  Recheck renal  function today, if stable can continue to monitor.  Continue to avoid NSAIDs.  If further decline, may need referral to nephrologist?  History of bladder and renal carcinoma, followed by urologist    4. Chronic gastritis without bleeding, unspecified gastritis type --stable  Really does need to follow-up with her GI specialist/Dr. Hernandez, last EGD almost 3 years ago.    Need records from GI specialist  Plan to continue Protonix 40 mg 1 p.o. daily.  Continue to avoid NSAIDs    5. History of right breast cancer --needs follow-up with oncologist/Dr. Turk, last visit almost 2 years ago.  Remains on Arimidex.  Mammogram is up-to-date.    6. History of bladder cancer --per urologist, compliant with follow-up.  No records available.  Status post cystoscopies and CT scans for urologist.  Last in October 2022    7. Carcinoma of left kidney --per urologist, same as above      In addition to wellness, seen and treated for separate and identifiable uncontrolled hyperlipidemia and CKD with hyperkalemia.       Follow Up:   Return in about 6 months (around 2/1/2024) for Recheck.     An After Visit Summary and PPPS were made available to the patient.

## 2023-08-01 NOTE — PATIENT INSTRUCTIONS
Need records from Dr. Julio Hernandez, and urologist    Needs to follow back up with GI/Dr. Julio Hernandez    Needs to follow-up with oncologist/Dr. Turk    Further recommendations to follow based on labs, may need referral to nephrologist    Needs information for Living Will    May receive shingles vaccine, Tdap vaccine, and COVID-vaccine from pharmacy in near future

## 2023-08-02 ENCOUNTER — TELEPHONE (OUTPATIENT)
Dept: FAMILY MEDICINE CLINIC | Facility: CLINIC | Age: 73
End: 2023-08-02

## 2023-08-02 DIAGNOSIS — K29.50 CHRONIC GASTRITIS WITHOUT BLEEDING, UNSPECIFIED GASTRITIS TYPE: Primary | ICD-10-CM

## 2023-08-02 LAB
ALBUMIN SERPL-MCNC: 4.3 G/DL (ref 3.5–5.2)
ALBUMIN/GLOB SERPL: 2 G/DL
ALP SERPL-CCNC: 54 U/L (ref 39–117)
ALT SERPL-CCNC: 22 U/L (ref 1–33)
AST SERPL-CCNC: 25 U/L (ref 1–32)
BASOPHILS # BLD AUTO: 0.02 10*3/MM3 (ref 0–0.2)
BASOPHILS NFR BLD AUTO: 0.3 % (ref 0–1.5)
BILIRUB SERPL-MCNC: 0.4 MG/DL (ref 0–1.2)
BUN SERPL-MCNC: 26 MG/DL (ref 8–23)
BUN/CREAT SERPL: 18.3 (ref 7–25)
CALCIUM SERPL-MCNC: 9.4 MG/DL (ref 8.6–10.5)
CHLORIDE SERPL-SCNC: 102 MMOL/L (ref 98–107)
CHOLEST SERPL-MCNC: 176 MG/DL (ref 0–200)
CO2 SERPL-SCNC: 29.4 MMOL/L (ref 22–29)
CREAT SERPL-MCNC: 1.42 MG/DL (ref 0.57–1)
EGFRCR SERPLBLD CKD-EPI 2021: 38.7 ML/MIN/1.73
EOSINOPHIL # BLD AUTO: 0.24 10*3/MM3 (ref 0–0.4)
EOSINOPHIL NFR BLD AUTO: 3.5 % (ref 0.3–6.2)
ERYTHROCYTE [DISTWIDTH] IN BLOOD BY AUTOMATED COUNT: 12.4 % (ref 12.3–15.4)
GLOBULIN SER CALC-MCNC: 2.1 GM/DL
GLUCOSE SERPL-MCNC: 90 MG/DL (ref 65–99)
HCT VFR BLD AUTO: 42.5 % (ref 34–46.6)
HDLC SERPL-MCNC: 84 MG/DL (ref 40–60)
HGB BLD-MCNC: 14.1 G/DL (ref 12–15.9)
IMM GRANULOCYTES # BLD AUTO: 0.02 10*3/MM3 (ref 0–0.05)
IMM GRANULOCYTES NFR BLD AUTO: 0.3 % (ref 0–0.5)
LDLC SERPL CALC-MCNC: 78 MG/DL (ref 0–100)
LDLC/HDLC SERPL: 0.92 {RATIO}
LYMPHOCYTES # BLD AUTO: 0.97 10*3/MM3 (ref 0.7–3.1)
LYMPHOCYTES NFR BLD AUTO: 14 % (ref 19.6–45.3)
MCH RBC QN AUTO: 30.4 PG (ref 26.6–33)
MCHC RBC AUTO-ENTMCNC: 33.2 G/DL (ref 31.5–35.7)
MCV RBC AUTO: 91.6 FL (ref 79–97)
MONOCYTES # BLD AUTO: 0.46 10*3/MM3 (ref 0.1–0.9)
MONOCYTES NFR BLD AUTO: 6.6 % (ref 5–12)
NEUTROPHILS # BLD AUTO: 5.22 10*3/MM3 (ref 1.7–7)
NEUTROPHILS NFR BLD AUTO: 75.3 % (ref 42.7–76)
NRBC BLD AUTO-RTO: 0 /100 WBC (ref 0–0.2)
PLATELET # BLD AUTO: 154 10*3/MM3 (ref 140–450)
POTASSIUM SERPL-SCNC: 5.3 MMOL/L (ref 3.5–5.2)
PROT SERPL-MCNC: 6.4 G/DL (ref 6–8.5)
RBC # BLD AUTO: 4.64 10*6/MM3 (ref 3.77–5.28)
SODIUM SERPL-SCNC: 142 MMOL/L (ref 136–145)
TRIGL SERPL-MCNC: 75 MG/DL (ref 0–150)
TSH SERPL DL<=0.005 MIU/L-ACNC: 1.23 UIU/ML (ref 0.27–4.2)
VLDLC SERPL CALC-MCNC: 14 MG/DL (ref 5–40)
WBC # BLD AUTO: 6.93 10*3/MM3 (ref 3.4–10.8)

## 2023-08-30 DIAGNOSIS — K29.00 ACUTE SUPERFICIAL GASTRITIS WITHOUT HEMORRHAGE: ICD-10-CM

## 2023-08-30 RX ORDER — PANTOPRAZOLE SODIUM 40 MG/1
40 TABLET, DELAYED RELEASE ORAL DAILY
Qty: 90 TABLET | Refills: 0 | Status: SHIPPED | OUTPATIENT
Start: 2023-08-30

## 2023-08-30 NOTE — TELEPHONE ENCOUNTER
Caller: [facility] [id number] [place] [address] - [phone] [facility] [phone] FX    Relationship: Pharmacy    Best call back number: [phone]     Requested Prescriptions:   Requested Prescriptions     Pending Prescriptions Disp Refills    pantoprazole (PROTONIX) 40 MG EC tablet 90 tablet 0     Sig: Take 1 tablet by mouth Daily.        Pharmacy where request should be sent: [facility] [id number] [place] [address] - [phone] [facility] [phone] FX     Last office visit with prescribing clinician: 8/1/2023   Last telemedicine visit with prescribing clinician: Visit date not found   Next office visit with prescribing clinician: 2/6/2024     Additional details provided by patient: COMPLETELY OUT. WOULD LIKE 90 DAY SUPPLY     Does the patient have less than a 3 day supply:  [x] Yes  [] No    Would you like a call back once the refill request has been completed: [] Yes [x] No    If the office needs to give you a call back, can they leave a voicemail: [] Yes [x] No    Nahed Bolden Rep   08/30/23 15:19 EDT

## 2023-09-12 NOTE — TELEPHONE ENCOUNTER
Caller: Enma Mckinney    Relationship: Self    Best call back number: [phone]      What specialty or service is being requested: GASTRO    What is the provider, practice or medical service name: DR. HAYWOOD      What is the office phone number: FAX #[phone]    Any additional details: THEY SAID SHE HAD TO HAVE A REFERRAL TO SEE HIM       
  Caller: Enma Mckinney    Relationship: Self    Best call back number: [phone]    Caller requesting test results: PATIENT    What test was performed: LABS    When was the test performed: 8/1/23    Where was the test performed: OFFICE    
Pt is aware that Dr. Baldwin is not in, labs have not been resulted yet  
English

## 2023-11-27 DIAGNOSIS — K29.00 ACUTE SUPERFICIAL GASTRITIS WITHOUT HEMORRHAGE: ICD-10-CM

## 2023-11-27 RX ORDER — PANTOPRAZOLE SODIUM 40 MG/1
40 TABLET, DELAYED RELEASE ORAL DAILY
Qty: 90 TABLET | Refills: 0 | Status: SHIPPED | OUTPATIENT
Start: 2023-11-27

## 2024-01-24 DIAGNOSIS — E78.5 HYPERLIPIDEMIA, UNSPECIFIED HYPERLIPIDEMIA TYPE: ICD-10-CM

## 2024-01-24 RX ORDER — ATORVASTATIN CALCIUM 10 MG/1
10 TABLET, FILM COATED ORAL DAILY
Qty: 90 TABLET | Refills: 1 | Status: SHIPPED | OUTPATIENT
Start: 2024-01-24

## 2024-02-26 DIAGNOSIS — K29.00 ACUTE SUPERFICIAL GASTRITIS WITHOUT HEMORRHAGE: ICD-10-CM

## 2024-02-26 RX ORDER — PANTOPRAZOLE SODIUM 40 MG/1
40 TABLET, DELAYED RELEASE ORAL DAILY
Qty: 90 TABLET | Refills: 0 | Status: SHIPPED | OUTPATIENT
Start: 2024-02-26

## 2024-05-25 DIAGNOSIS — K29.00 ACUTE SUPERFICIAL GASTRITIS WITHOUT HEMORRHAGE: ICD-10-CM

## 2024-05-28 RX ORDER — PANTOPRAZOLE SODIUM 40 MG/1
40 TABLET, DELAYED RELEASE ORAL DAILY
Qty: 90 TABLET | Refills: 0 | Status: SHIPPED | OUTPATIENT
Start: 2024-05-28

## 2024-07-19 DIAGNOSIS — E78.5 HYPERLIPIDEMIA, UNSPECIFIED HYPERLIPIDEMIA TYPE: ICD-10-CM

## 2024-07-19 RX ORDER — ATORVASTATIN CALCIUM 10 MG/1
10 TABLET, FILM COATED ORAL DAILY
Qty: 90 TABLET | Refills: 1 | Status: SHIPPED | OUTPATIENT
Start: 2024-07-19

## 2024-08-26 DIAGNOSIS — K29.00 ACUTE SUPERFICIAL GASTRITIS WITHOUT HEMORRHAGE: ICD-10-CM

## 2024-08-26 RX ORDER — PANTOPRAZOLE SODIUM 40 MG/1
40 TABLET, DELAYED RELEASE ORAL DAILY
Qty: 90 TABLET | Refills: 0 | OUTPATIENT
Start: 2024-08-26

## 2024-09-04 DIAGNOSIS — K29.00 ACUTE SUPERFICIAL GASTRITIS WITHOUT HEMORRHAGE: ICD-10-CM

## 2024-09-04 RX ORDER — PANTOPRAZOLE SODIUM 40 MG/1
40 TABLET, DELAYED RELEASE ORAL DAILY
Qty: 90 TABLET | Refills: 0 | OUTPATIENT
Start: 2024-09-04

## 2025-01-22 DIAGNOSIS — E78.5 HYPERLIPIDEMIA, UNSPECIFIED HYPERLIPIDEMIA TYPE: ICD-10-CM

## 2025-01-22 RX ORDER — ATORVASTATIN CALCIUM 10 MG/1
10 TABLET, FILM COATED ORAL DAILY
Qty: 90 TABLET | Refills: 1 | OUTPATIENT
Start: 2025-01-22